# Patient Record
Sex: FEMALE | Race: WHITE
[De-identification: names, ages, dates, MRNs, and addresses within clinical notes are randomized per-mention and may not be internally consistent; named-entity substitution may affect disease eponyms.]

---

## 2017-04-01 ENCOUNTER — HOSPITAL ENCOUNTER (INPATIENT)
Dept: HOSPITAL 82 - ED | Age: 42
LOS: 4 days | Discharge: HOME | DRG: 872 | End: 2017-04-05
Attending: INTERNAL MEDICINE | Admitting: INTERNAL MEDICINE
Payer: COMMERCIAL

## 2017-04-01 VITALS — SYSTOLIC BLOOD PRESSURE: 124 MMHG | DIASTOLIC BLOOD PRESSURE: 77 MMHG

## 2017-04-01 VITALS — WEIGHT: 229 LBS | HEIGHT: 61 IN | BODY MASS INDEX: 43.23 KG/M2

## 2017-04-01 DIAGNOSIS — A41.9: Primary | ICD-10-CM

## 2017-04-01 DIAGNOSIS — N10: ICD-10-CM

## 2017-04-01 DIAGNOSIS — B96.20: ICD-10-CM

## 2017-04-01 DIAGNOSIS — R11.2: ICD-10-CM

## 2017-04-01 DIAGNOSIS — Z23: ICD-10-CM

## 2017-04-01 DIAGNOSIS — Z87.891: ICD-10-CM

## 2017-04-01 LAB
ALBUMIN SERPL-MCNC: 3.4 G/DL (ref 3.2–5)
ALP SERPL-CCNC: 278 U/L (ref 38–126)
ALT SERPL-CCNC: 36 U/L (ref 9–52)
ANION GAP SERPL CALCULATED.3IONS-SCNC: 18 MMOL/L
AST SERPL-CCNC: 26 U/L (ref 14–36)
BACTERIA #/AREA URNS HPF: (no result) HPF
BASOPHILS NFR BLD AUTO: 0 % (ref 0–3)
BILIRUB UR QL STRIP.AUTO: NEGATIVE
BUN SERPL-MCNC: 15 MG/DL (ref 7–17)
BUN/CREAT SERPL: 20
CALCIUM SERPL-MCNC: 9.5 MG/DL (ref 8.4–10.2)
CHLORIDE SERPL-SCNC: 95 MMOL/L (ref 95–108)
CLARITY UR: (no result)
CO2 SERPL-SCNC: 30 MMOL/L (ref 22–30)
COLOR UR AUTO: YELLOW
CREAT SERPL-MCNC: 0.8 MG/DL (ref 0.5–1)
EOSINOPHIL NFR BLD AUTO: 1 % (ref 0–8)
ERYTHROCYTE [DISTWIDTH] IN BLOOD BY AUTOMATED COUNT: 13.8 % (ref 11.5–15.5)
GLUCOSE SERPL-MCNC: 131 MG/DL (ref 65–105)
GLUCOSE UR STRIP.AUTO-MCNC: NEGATIVE MG/DL
HCT VFR BLD AUTO: 36.2 % (ref 37–47)
HGB BLD-MCNC: 12.1 G/DL (ref 12–16)
HGB UR QL STRIP.AUTO: (no result)
IMM GRANULOCYTES NFR BLD: 1.1 % (ref 0–1)
KETONES UR STRIP.AUTO-MCNC: NEGATIVE MG/DL
LEUKOCYTE ESTERASE UR QL STRIP.AUTO: (no result)
LYMPHOCYTES NFR BLD: 9 % (ref 15–41)
MCH RBC QN AUTO: 26.7 PG  CALC (ref 26–32)
MCHC RBC AUTO-ENTMCNC: 33.4 G/L CALC (ref 32–36)
MCV RBC AUTO: 79.7 FL  CALC (ref 80–100)
MONOCYTES NFR BLD AUTO: 7 % (ref 2–13)
NEUTROPHILS # BLD AUTO: 15.7 THOU/UL (ref 2–7.15)
NEUTROPHILS NFR BLD AUTO: 82 % (ref 42–76)
NITRITE UR QL STRIP.AUTO: POSITIVE
PH UR STRIP.AUTO: 6 [PH] (ref 4.5–8)
PLATELET # BLD AUTO: 405 THOU/UL (ref 130–400)
POTASSIUM SERPL-SCNC: 3.4 MMOL/L (ref 3.5–5.1)
PROT SERPL-MCNC: 7.5 G/DL (ref 6.3–8.2)
PROT UR QL STRIP.AUTO: 100 MG/DL
RBC # BLD AUTO: 4.54 MILL/UL (ref 4.2–5.6)
RBC #/AREA URNS HPF: (no result) RBC/HPF (ref 0–5)
SODIUM SERPL-SCNC: 139 MMOL/L (ref 137–146)
SP GR UR STRIP.AUTO: 1.02
SQUAMOUS URNS QL MICRO: (no result) EPI/HPF
UROBILINOGEN UR QL STRIP.AUTO: >=8 E.U./DL
WBC #/AREA URNS HPF: (no result) WBC/HPF (ref 0–5)

## 2017-04-01 NOTE — NUR
PT ARRIVED TO UNIT VIA WHEELCHAIR WITH LILIAM LYLE. AMBULATED FROM W/C TO BED
INDEPENDENTLY. STATES THAT ANALGESICS ADMINISTERED IN ER WERE EFFECTIVE.
RESPIRATIONS ARE EVEN AND UNLABORED. ORIENTED TO ROOM. SAFETY MEASURES PUT IN
PLACE. CALL LIGHT SYSTEM REVIEWED AND IN REACH.

## 2017-04-01 NOTE — NUR
PT RESTING COMFORTABLY RATES PAIN 0/10 AFTER MEDS AS ORDERED. NO VOMITING SINCE
PTA. SRX2,CALL LIGHT WITHIN REACH. VSS.DAUGHTER-IN -LAW AT BEDSIDE

## 2017-04-02 VITALS — SYSTOLIC BLOOD PRESSURE: 144 MMHG | DIASTOLIC BLOOD PRESSURE: 92 MMHG

## 2017-04-02 VITALS — SYSTOLIC BLOOD PRESSURE: 142 MMHG | DIASTOLIC BLOOD PRESSURE: 88 MMHG

## 2017-04-02 VITALS — DIASTOLIC BLOOD PRESSURE: 68 MMHG | SYSTOLIC BLOOD PRESSURE: 128 MMHG

## 2017-04-02 VITALS — DIASTOLIC BLOOD PRESSURE: 75 MMHG | SYSTOLIC BLOOD PRESSURE: 123 MMHG

## 2017-04-02 VITALS — DIASTOLIC BLOOD PRESSURE: 81 MMHG | SYSTOLIC BLOOD PRESSURE: 125 MMHG

## 2017-04-02 LAB
ANION GAP SERPL CALCULATED.3IONS-SCNC: 14 MMOL/L
BASOPHILS NFR BLD AUTO: 0 % (ref 0–3)
BUN SERPL-MCNC: 15 MG/DL (ref 7–17)
BUN/CREAT SERPL: 19
CALCIUM SERPL-MCNC: 8.2 MG/DL (ref 8.4–10.2)
CHLORIDE SERPL-SCNC: 100 MMOL/L (ref 95–108)
CO2 SERPL-SCNC: 28 MMOL/L (ref 22–30)
CREAT SERPL-MCNC: 0.8 MG/DL (ref 0.5–1)
EOSINOPHIL NFR BLD AUTO: 1 % (ref 0–8)
ERYTHROCYTE [DISTWIDTH] IN BLOOD BY AUTOMATED COUNT: 14 % (ref 11.5–15.5)
GLUCOSE SERPL-MCNC: 190 MG/DL (ref 65–105)
HCT VFR BLD AUTO: 30.4 % (ref 37–47)
HGB BLD-MCNC: 10.1 G/DL (ref 12–16)
IMM GRANULOCYTES NFR BLD: 2 % (ref 0–1)
LYMPHOCYTES NFR BLD: 12 % (ref 15–41)
MCH RBC QN AUTO: 26.8 PG  CALC (ref 26–32)
MCHC RBC AUTO-ENTMCNC: 33.2 G/L CALC (ref 32–36)
MCV RBC AUTO: 80.6 FL  CALC (ref 80–100)
MONOCYTES NFR BLD AUTO: 8 % (ref 2–13)
NEUTROPHILS # BLD AUTO: 13.19 THOU/UL (ref 2–7.15)
NEUTROPHILS NFR BLD AUTO: 77 % (ref 42–76)
PLATELET # BLD AUTO: 367 THOU/UL (ref 130–400)
POTASSIUM SERPL-SCNC: 3.5 MMOL/L (ref 3.5–5.1)
RBC # BLD AUTO: 3.77 MILL/UL (ref 4.2–5.6)
SODIUM SERPL-SCNC: 137 MMOL/L (ref 137–146)

## 2017-04-02 NOTE — NUR
PT RESTING IN SEMI FOWLERS POSITION;PT DENIES ANY PAIN OR DISCOMFORTS;IV
FLUIDS INFUSING WELL TO LAC;ASSESSMENT COMPLETED;PT HAS A CURRENT TEMP OF
100.8;TYLENOL PROVIDED PER PRN ORDER;BLANKETS REMOVED,AC LOWERED AND COOL
PACKS PROVIDED;PT AMBULATED TO RESTROOM WITH STEADY GAIT;PT DENIES ANY NEEDS
AT THIS TIME;SAFETY PRECAUTIONS IN PLACE;BED IN LOWEST POSITION WITH CALL
LIGHT IN REACH;WILL CONTINUE TO MONITOR

## 2017-04-02 NOTE — NUR
PT C/O FLANK PAIN RATING 6 OUT OF 10; MEDICATED WITH LORTAB PER PRN ORDERS;
IVF INFUSING AT PRESCRIBED RATE; PT DENIES ANY N/V AT THIS TIME; PT ENCOURAGED
TO CALL FOR ANY ASSISTANCE NEEDED; CALL LIGHT WITHIN REACH; WILL CONTINUE TO
MONITOR

## 2017-04-02 NOTE — NUR
PT RESTING IN SEMI FOWLERS POSITION;PT COMPLAINS OF BACK PAIN RATING 7/10 ON
THE PAIN SCALE AND REQUESTS PRN PAIN MEDICATION;PT MEDICATED ACCORDINGLY;IV
FLUIDS INFUSING WELL;PT DENIES ANY OTHER NEEDS AT THIS TIME;BED IN LOWEST
POSITION WITH CALL LIGHT IN REACH;WILL CONTINUE TO MONITOR

## 2017-04-02 NOTE — NUR
REPORT RECIEVED FROM BECKIE MARTINEZ; PT RESTING IN BED; TEMP RECHECKED READING
100.4; PT MEDICATED WITH TYLENOL PER PRN ORDER; WILL RECHECK TEMP LATER; PT
DENIES ANY PAIN OR DISCOMFORT AT THIS TIME; PT ENCOURAGED TO CALL FOR ANY
ASSISTANCE NEEDED; CALL LIGHT WITHIN REACH; WILL CONTINUE TO MONITOR

## 2017-04-02 NOTE — NUR
PT IS ALSEEP AT THIS TIME. NO SIGNS OF PAIN NOTED. DIALUDID GIVEN EARLIER FOR
LOWER BACK PAIN THAT RADIATES TO RIGHT ABDOMEN. RESPIRATIONS EVEN AND
UNLABORED. IV FLUIDS INFUSING ADEQUATLEY IN IV SITE THAT APPEARS HEALTHY.
 AND SON AT BEDSIDE. SCDS IN PLACE. SAFETY MEASURES IN PLACE. CALL
LIGHT IN REACH.

## 2017-04-02 NOTE — NUR
PT SITTING UP IN BED; NO S/S OF DISTRESS NOTED; PT DENIES ANY PAIN AT THIS
TIME; IVF INFUSING AT PRESCRIBED RATE; CALL LIGHT WITHIN REACH; WILL CONTINUE
TO MONITOR

## 2017-04-03 VITALS — DIASTOLIC BLOOD PRESSURE: 88 MMHG | SYSTOLIC BLOOD PRESSURE: 142 MMHG

## 2017-04-03 VITALS — DIASTOLIC BLOOD PRESSURE: 87 MMHG | SYSTOLIC BLOOD PRESSURE: 157 MMHG

## 2017-04-03 VITALS — SYSTOLIC BLOOD PRESSURE: 126 MMHG | DIASTOLIC BLOOD PRESSURE: 73 MMHG

## 2017-04-03 VITALS — DIASTOLIC BLOOD PRESSURE: 91 MMHG | SYSTOLIC BLOOD PRESSURE: 143 MMHG

## 2017-04-03 PROCEDURE — 3E0234Z INTRODUCTION OF SERUM, TOXOID AND VACCINE INTO MUSCLE, PERCUTANEOUS APPROACH: ICD-10-PCS | Performed by: INTERNAL MEDICINE

## 2017-04-03 NOTE — NUR
PATIENT C/O RIGHT SIDED FLANK PAIN-MEDICATED WITH LORTAB AS ORDERED FOR PAIN.
INSTRUCTED REGUARDING I&O AND URINE HAT PLACED IN THE TOILET.  PATIENT IS
VOIDING YELLOW URINE AT THIS TIME-STATES THAT HER URINE IS MUCH LIGHTER THAN
IT WAS.  CALL LIGHT IN REACH. WILL CONT TO MONITOR.

## 2017-04-03 NOTE — NUR
PATIENT RESTING IN BED AT THIS TIME-C/O RIGHT FLANK PAIN 8/10 ON PAIN SCALE.
PATIENT MEDICATED WITH LORTAB 10/325MG PO AS ORDERED.  PATIENT UP TO THE
BR-STEADY ON HER FEET.  PATIENT VOIDED 200CC OF YELLOW URINE-INSTRUCTED TO
SAVE URINE FOR I&O.  PATIENT STATES
THAT SHE HAD BM EARLIER AFTER NOT GOING FOR
5 DAYS.  ABD IS SOFT AND NON-TENDER WITH ACTIVE BS PRESENT. IVF NS PATENT AND
INFUSING AT 150CC/HR VIA LEFT AC SITE-SITE APPEARS HEALTHY AT THIS TIME.
SAFETY PRECAUTIONS REINFORCED.  CALL LIGHT IN REACH. WILL CONT TO MONITOR.

## 2017-04-03 NOTE — NUR
PT RESTING IN SEMI FOWLERS POSITION;PT COMPLAINS OF BACK AND RIGHT FLANK PAIN
RATING 7/10 ON THE PAIN SCALE AND REQUESTS PRN PAIN MEDICATION;MEDICATED
ACCORDINGLY;PT REPORTS THAT SHE HAD VOMITED WITHIN THE HOUR,THIS WAS NOT
OBSERVED BY WRITTER;PRN ZOFRAN PROVIDED;IV FLUIDS INFUSING WELL AT THIS
TIME;PT DENIES ANY OTHER NEEDS;BED IN LOWEST POSITION WITH CALL LIGHT IN
REACH;WILL CONTINUE TO MONITOR

## 2017-04-03 NOTE — NUR
SHIFT CHANGE REPORT FROM ANA ROCKWELL SLEEPING WITH HEAD COVERED AT THIS TIME,
BREATHING EVEN AND NON-LABORED, IVF INFUSING, CALL BELL IN REACH.

## 2017-04-04 VITALS — SYSTOLIC BLOOD PRESSURE: 146 MMHG | DIASTOLIC BLOOD PRESSURE: 90 MMHG

## 2017-04-04 VITALS — DIASTOLIC BLOOD PRESSURE: 75 MMHG | SYSTOLIC BLOOD PRESSURE: 148 MMHG

## 2017-04-04 VITALS — DIASTOLIC BLOOD PRESSURE: 88 MMHG | SYSTOLIC BLOOD PRESSURE: 140 MMHG

## 2017-04-04 VITALS — SYSTOLIC BLOOD PRESSURE: 134 MMHG | DIASTOLIC BLOOD PRESSURE: 90 MMHG

## 2017-04-04 LAB
ANION GAP SERPL CALCULATED.3IONS-SCNC: 14 MMOL/L
BUN SERPL-MCNC: 9 MG/DL (ref 7–17)
BUN/CREAT SERPL: 14
CALCIUM SERPL-MCNC: 8.7 MG/DL (ref 8.4–10.2)
CHLORIDE SERPL-SCNC: 98 MMOL/L (ref 95–108)
CO2 SERPL-SCNC: 30 MMOL/L (ref 22–30)
CREAT SERPL-MCNC: 0.7 MG/DL (ref 0.5–1)
EOSINOPHIL NFR BLD MANUAL: 3 % (ref 0–8)
ERYTHROCYTE [DISTWIDTH] IN BLOOD BY AUTOMATED COUNT: 13.5 % (ref 11.5–15.5)
GLUCOSE SERPL-MCNC: 198 MG/DL (ref 65–105)
HCT VFR BLD AUTO: 31.2 % (ref 37–47)
HGB BLD-MCNC: 10.5 G/DL (ref 12–16)
LYMPHOCYTES NFR BLD MANUAL: 25 % (ref 15–41)
MANUAL DIFF BLD: YES
MCH RBC QN AUTO: 26.7 PG  CALC (ref 26–32)
MCHC RBC AUTO-ENTMCNC: 33.7 G/L CALC (ref 32–36)
MCV RBC AUTO: 79.4 FL  CALC (ref 80–100)
METAMYELOCYTES NFR BLD MANUAL: 3 % (ref 0–1)
MONOCYTES NFR BLD MANUAL: 1 % (ref 2–13)
NEUTS SEG NFR BLD MANUAL: 68 % (ref 42–76)
PLATELET # BLD AUTO: 439 THOU/UL (ref 130–400)
POTASSIUM SERPL-SCNC: 3.7 MMOL/L (ref 3.5–5.1)
RBC # BLD AUTO: 3.93 MILL/UL (ref 4.2–5.6)
SODIUM SERPL-SCNC: 139 MMOL/L (ref 137–146)

## 2017-04-04 NOTE — NUR
RESTING IN BED, STATES HER PAIN TO R. SIDE HAS COMPLETELY SUBSIDED, REQUESTING
SNACK AT THIS TIME, NEEDS ADDRESSED, CALL BELL IN REACH.

## 2017-04-04 NOTE — NUR
PATIENT CALLED AND C/O RIGHT SIDE PAIN-8/10 ON PAIN SCALE.  MEDICATED WITH
LORTAB 10/325MG PO AS ORDERED.  PATIENT CONT TO VOID QS-HAT EMPTIED.  CALL
LIGHT IN REACH. WILL CONT TO MONITOR.

## 2017-04-04 NOTE — NUR
PATIENT UP TO THE BR TO VOID WITHOUT ANY DIFFICULTY.  PATIENT IS STEADY ON HER
FEET.  PATIENT C/O RIGHT FLANK PAIN AND MEDICATED WITH LORTAB 10/325MG PO AS
ORDERED FOR PAIN.  IV SITE APPEARS RED AND PAINFUL.  SITE D/C AND NEW IV SITE
STARTED TO RIGHT FOREARM-#22 GAUGE WITH GOOD BLOOD RETURN.  IVF NS PATENT AND
INFUSING AT 150CC/HR.  IV SITE TO LEFT AC D/RAMÍREZ.  CALL LIGHT IN REACH. WILL
CONT TO MONITOR.

## 2017-04-04 NOTE — NUR
PATIENT APPEARS SLEEPING AT THIS TIME WITH EYES CLOSED.  IVF PATENT AND
INFUSING AT 150CC/HR.  CALL LIGHT IN REACH. WILL CONT TO MONITOR,

## 2017-04-04 NOTE — NUR
SHIFT CHANGE REPORT FROM ANA WILKINS SLEEPING AT THIS TIME BUT AWAKENED SORTLY
AFTER C/O ACHING HEADACHE @ 9/10, CONCERN ADDRESSED WITH MED AND ICE PACK,
IVF INFUSING, CALL BELL IN REACH, WILL CONTINUE TO MONITOR.

## 2017-04-04 NOTE — NUR
PATIENT APPEARS SLEEPING AT THIS TIME WITH EYES CLOSED.  CALL LIGHT IN REACH.
IVF PATENT AND INFUSING AT 150CC/HR VIA LEFT AC IV SITE.  SITE APPEARS HEALTHY
AT TH IS TIME.  WILL CONT TO MONITOR.

## 2017-04-05 VITALS — SYSTOLIC BLOOD PRESSURE: 149 MMHG | DIASTOLIC BLOOD PRESSURE: 76 MMHG

## 2017-04-05 VITALS — SYSTOLIC BLOOD PRESSURE: 155 MMHG | DIASTOLIC BLOOD PRESSURE: 79 MMHG

## 2017-04-05 LAB
ANION GAP SERPL CALCULATED.3IONS-SCNC: 15 MMOL/L
BUN SERPL-MCNC: 10 MG/DL (ref 7–17)
BUN/CREAT SERPL: 16
CALCIUM SERPL-MCNC: 9 MG/DL (ref 8.4–10.2)
CHLORIDE SERPL-SCNC: 99 MMOL/L (ref 95–108)
CO2 SERPL-SCNC: 28 MMOL/L (ref 22–30)
CREAT SERPL-MCNC: 0.7 MG/DL (ref 0.5–1)
EOSINOPHIL NFR BLD MANUAL: 8 % (ref 0–8)
ERYTHROCYTE [DISTWIDTH] IN BLOOD BY AUTOMATED COUNT: 13.5 % (ref 11.5–15.5)
GLUCOSE SERPL-MCNC: 200 MG/DL (ref 65–105)
HCT VFR BLD AUTO: 32.7 % (ref 37–47)
HGB BLD-MCNC: 10.8 G/DL (ref 12–16)
IMM GRANULOCYTES NFR BLD: 7.9 % (ref 0–1)
LYMPHOCYTES NFR BLD MANUAL: 17 % (ref 15–41)
MANUAL DIFF BLD: YES
MCH RBC QN AUTO: 26.1 PG  CALC (ref 26–32)
MCHC RBC AUTO-ENTMCNC: 33 G/L CALC (ref 32–36)
MCV RBC AUTO: 79 FL  CALC (ref 80–100)
METAMYELOCYTES NFR BLD MANUAL: 8 % (ref 0–1)
MONOCYTES NFR BLD MANUAL: 5 % (ref 2–13)
NEUTS BAND NFR BLD MANUAL: 2 % (ref 0–8)
NEUTS SEG NFR BLD MANUAL: 60 % (ref 42–76)
PLATELET # BLD AUTO: 527 THOU/UL (ref 130–400)
POTASSIUM SERPL-SCNC: 3.5 MMOL/L (ref 3.5–5.1)
RBC # BLD AUTO: 4.14 MILL/UL (ref 4.2–5.6)
SODIUM SERPL-SCNC: 139 MMOL/L (ref 137–146)

## 2017-04-05 NOTE — NUR
PT RESTING IN BED; C/O HEADACHE; PT INFORMED WHEN NEXT AVAILABLE DOSE OF PAIN
MEDICATION; IVF INFUSING AT PRESCRIBED RATE; PT DENIES ANY N/V AT THIS TIME;
CALL LIGHT WIHTIN REACH; WILL CONTINUE TO MONITOR

## 2017-04-05 NOTE — NUR
PATIENT C/O RIGHT FLANK AND ABD PAIN-7/10 ON PAIN SCALE.  PATIENT MEDICATED
WITH LORTAB 10/325MG PO AS ORDERED.  CALL LIGHT IN REACH.  IVF PATENT AND
INFUSING AT 150CC/HR.  WILL CONT TO MONITOR.

## 2017-04-05 NOTE — NUR
REPORT RECIEVED FROM LILIAM WILKINS; PT RESTING IN BED WITH EYES CLOSED; NO S/S OF
DISTRESS NOTED; FALL PRECAUTIONS IN PLACE; CALL LIGHT WITHIN REACH; WILL
CONTINUE TO MONITOR

## 2017-04-05 NOTE — NUR
Discharge instructions given. Patient verbalizes understanding of same.
Discharged in stable condition via Ambulatory to Home with
family. All belongings sent with pt.

## 2017-04-05 NOTE — NUR
PATIENT APPEARS SLEEPING POSITIONED ON RIGHT SIDE WITH EYES CLOSED.  CALL
LIGHT IN REACH.  WILL CONT TO MONITOR.

## 2017-10-27 ENCOUNTER — HOSPITAL ENCOUNTER (EMERGENCY)
Dept: HOSPITAL 82 - ED | Age: 42
Discharge: HOME | DRG: 552 | End: 2017-10-27
Payer: COMMERCIAL

## 2017-10-27 VITALS — DIASTOLIC BLOOD PRESSURE: 87 MMHG | SYSTOLIC BLOOD PRESSURE: 137 MMHG

## 2017-10-27 VITALS — BODY MASS INDEX: 41.62 KG/M2 | HEIGHT: 61 IN | WEIGHT: 220.46 LBS

## 2017-10-27 DIAGNOSIS — M25.562: ICD-10-CM

## 2017-10-27 DIAGNOSIS — E11.9: ICD-10-CM

## 2017-10-27 DIAGNOSIS — F17.210: ICD-10-CM

## 2017-10-27 DIAGNOSIS — S16.1XXA: Primary | ICD-10-CM
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
## 2017-10-27 DIAGNOSIS — M54.9: ICD-10-CM
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
## 2017-10-27 DIAGNOSIS — E78.5: ICD-10-CM

## 2017-10-27 DIAGNOSIS — S09.90XA: ICD-10-CM

## 2017-10-27 DIAGNOSIS — M54.2: ICD-10-CM

## 2017-10-31 ENCOUNTER — HOSPITAL ENCOUNTER (OUTPATIENT)
Dept: HOSPITAL 82 - ED | Age: 42
Setting detail: OBSERVATION
LOS: 1 days | Discharge: HOME | DRG: 313 | End: 2017-11-01
Payer: COMMERCIAL

## 2017-10-31 VITALS — SYSTOLIC BLOOD PRESSURE: 136 MMHG | DIASTOLIC BLOOD PRESSURE: 89 MMHG

## 2017-10-31 VITALS — HEIGHT: 61 IN | BODY MASS INDEX: 44.37 KG/M2 | WEIGHT: 235 LBS

## 2017-10-31 DIAGNOSIS — R42: ICD-10-CM

## 2017-10-31 DIAGNOSIS — R20.2: ICD-10-CM

## 2017-10-31 DIAGNOSIS — R07.89: Primary | ICD-10-CM

## 2017-10-31 DIAGNOSIS — I10: ICD-10-CM

## 2017-10-31 DIAGNOSIS — E78.5: ICD-10-CM

## 2017-10-31 DIAGNOSIS — F17.210: ICD-10-CM

## 2017-10-31 DIAGNOSIS — E78.1: ICD-10-CM

## 2017-10-31 DIAGNOSIS — Z79.84: ICD-10-CM

## 2017-10-31 DIAGNOSIS — E11.9: ICD-10-CM

## 2017-10-31 LAB
ALBUMIN SERPL-MCNC: 3.8 G/DL (ref 3.2–5)
ALP SERPL-CCNC: 82 U/L (ref 38–126)
ALT SERPL-CCNC: 27 U/L (ref 9–52)
ANION GAP SERPL CALCULATED.3IONS-SCNC: 14 MMOL/L
AST SERPL-CCNC: 16 U/L (ref 14–36)
BARBITURATES UR-MCNC: NEGATIVE UG/ML
BASOPHILS NFR BLD AUTO: 0 % (ref 0–3)
BILIRUB UR QL STRIP.AUTO: NEGATIVE
BUN SERPL-MCNC: 14 MG/DL (ref 7–17)
BUN/CREAT SERPL: 17
CALCIUM SERPL-MCNC: 9.1 MG/DL (ref 8.4–10.2)
CHLORIDE SERPL-SCNC: 106 MMOL/L (ref 95–108)
CLARITY UR: (no result)
CO2 SERPL-SCNC: 26 MMOL/L (ref 22–30)
COCAINE UR-MCNC: NEGATIVE NG/ML
COLOR UR AUTO: YELLOW
CREAT SERPL-MCNC: 0.8 MG/DL (ref 0.5–1)
EOSINOPHIL NFR BLD AUTO: 3 % (ref 0–8)
ERYTHROCYTE [DISTWIDTH] IN BLOOD BY AUTOMATED COUNT: 14 % (ref 11.5–15.5)
GLUCOSE SERPL-MCNC: 188 MG/DL (ref 65–105)
GLUCOSE UR STRIP.AUTO-MCNC: 100 MG/DL
HCT VFR BLD AUTO: 35.9 % (ref 37–47)
HGB BLD-MCNC: 11.7 G/DL (ref 12–16)
HGB UR QL STRIP.AUTO: NEGATIVE
IMM GRANULOCYTES NFR BLD: 0.6 % (ref 0–1)
KETONES UR STRIP.AUTO-MCNC: NEGATIVE MG/DL
LEUKOCYTE ESTERASE UR QL STRIP.AUTO: NEGATIVE
LYMPHOCYTES NFR BLD: 33 % (ref 15–41)
MCH RBC QN AUTO: 26.6 PG  CALC (ref 26–32)
MCHC RBC AUTO-ENTMCNC: 32.6 G/L CALC (ref 32–36)
MCV RBC AUTO: 81.6 FL  CALC (ref 80–100)
METHADONE SERPL-MCNC: NEGATIVE NG/ML
MONOCYTES NFR BLD AUTO: 5 % (ref 2–13)
MYOGLOBIN SERPL-MCNC: 18 NG/ML (ref 0–62)
NEUTROPHILS # BLD AUTO: 8.22 THOU/UL (ref 2–7.15)
NEUTROPHILS NFR BLD AUTO: 59 % (ref 42–76)
NITRITE UR QL STRIP.AUTO: NEGATIVE
OXCYCODONE: NEGATIVE
PH UR STRIP.AUTO: 6 [PH] (ref 4.5–8)
PLATELET # BLD AUTO: 380 THOU/UL (ref 130–400)
POTASSIUM SERPL-SCNC: 3.6 MMOL/L (ref 3.5–5.1)
PROT SERPL-MCNC: 6.8 G/DL (ref 6.3–8.2)
PROT UR QL STRIP.AUTO: NEGATIVE MG/DL
RBC # BLD AUTO: 4.4 MILL/UL (ref 4.2–5.6)
SODIUM SERPL-SCNC: 142 MMOL/L (ref 137–146)
SP GR UR STRIP.AUTO: >=1.03
TETRAHYDROCANNABIONOL: NEGATIVE
TRICYLIC ANTIDEPRESSANTS: NEGATIVE
UROBILINOGEN UR QL STRIP.AUTO: 0.2 E.U./DL

## 2017-10-31 PROCEDURE — G0378 HOSPITAL OBSERVATION PER HR: HCPCS

## 2017-10-31 NOTE — NUR
Admission Note
 
 
Report Given to:         BECKIE MARTINEZ
Transported by:          X Wheelchair           Stretcher
 
Transported with:        X Nurse     Transporter   X Patent IV    O2
                         X Cardiac Monitor

## 2017-10-31 NOTE — NUR
ARRIVES VIA [facility], IN NAD AT PRESENT. RECEIVED  MG PO AND 2 DOSES
OF NITROGLYCERIN FROM EMS.

## 2017-10-31 NOTE — NUR
PT ARRIVED TO UNIT VIA WHEELCHAIR WITH ER STAFF. ALERT AND ORINETED UPON
ARRIVAL WITH . STATES PAIN LEVEL 7/10 TO CHEST. RESPIRATIONS EVEN
AND UNLABORED WITH SOB ON EXERTION. 98% ON ROOM AIR, HOWEVER, REQUESTS OXYGEN
FOR SOB. PLACED ON 2L PRN. ORIENTED TO ROOM AND CALL LIGHT SYSTEM. SAFETY
MEASURES IMPLEMENTED. CALL LIGHT SYSTEM REVIEWED AND IN REACH.

## 2017-11-01 VITALS — DIASTOLIC BLOOD PRESSURE: 82 MMHG | SYSTOLIC BLOOD PRESSURE: 136 MMHG

## 2017-11-01 VITALS — SYSTOLIC BLOOD PRESSURE: 134 MMHG | DIASTOLIC BLOOD PRESSURE: 75 MMHG

## 2017-11-01 VITALS — SYSTOLIC BLOOD PRESSURE: 149 MMHG | DIASTOLIC BLOOD PRESSURE: 81 MMHG

## 2017-11-01 LAB
ANION GAP SERPL CALCULATED.3IONS-SCNC: 11 MMOL/L
BUN SERPL-MCNC: 15 MG/DL (ref 7–17)
BUN/CREAT SERPL: 20
CALCIUM SERPL-MCNC: 9.2 MG/DL (ref 8.4–10.2)
CHLORIDE SERPL-SCNC: 108 MMOL/L (ref 95–108)
CHOLEST SERPL-MCNC: 188 MG/DL (ref 0–199)
CHOLEST/HDLC SERPL: 5.3 {RATIO}
CO2 SERPL-SCNC: 26 MMOL/L (ref 22–30)
CREAT SERPL-MCNC: 0.8 MG/DL (ref 0.5–1)
GLUCOSE SERPL-MCNC: 196 MG/DL (ref 65–105)
HDLC SERPL-MCNC: 35 MG/DL (ref 40–?)
MAGNESIUM SERPL-MCNC: 1.9 MG/DL (ref 1.6–2.3)
POTASSIUM SERPL-SCNC: 4.1 MMOL/L (ref 3.5–5.1)
SODIUM SERPL-SCNC: 141 MMOL/L (ref 137–146)
TRIGL SERPL-MCNC: 492 MG/DL (ref 30–149)
VLDLC SERPL CALC-MCNC: 98 MG/DL

## 2017-11-01 NOTE — NUR
PT MEDICATED AS ORDERED FOR MIDSTERNAL PAIN 6/10; TELE MONITOR IN PLACE; CALL
BELL WITHIN REACH; WILL CONTINUE TO MONITOR.

## 2017-11-01 NOTE — NUR
Patient consented to receive pneumonia vaccine for this admission. Pneumonia
vaccine NOT given as patient just receive [id number] (Pneumovax) on 04/03/2017 at
this facility. Patient is not due for another pneumonia vaccine at this time.

## 2017-11-01 NOTE — NUR
PT RESTING WITH EYES CLOSED; AROUSED EASILY TO VERBAL STIMULI; TELE MONITOR IN
PLACE; NO COMPLAINTS VOICED AT THIS TIME; CALL BELL WITHIN REACH; WILL
CONTINUE TO MONITOR.

## 2017-12-06 ENCOUNTER — HOSPITAL ENCOUNTER (EMERGENCY)
Dept: HOSPITAL 82 - ED | Age: 42
Discharge: HOME | DRG: 313 | End: 2017-12-06
Payer: COMMERCIAL

## 2017-12-06 VITALS — HEIGHT: 61 IN | WEIGHT: 220.46 LBS | BODY MASS INDEX: 41.62 KG/M2

## 2017-12-06 VITALS — SYSTOLIC BLOOD PRESSURE: 129 MMHG | DIASTOLIC BLOOD PRESSURE: 84 MMHG

## 2017-12-06 DIAGNOSIS — R06.02: ICD-10-CM

## 2017-12-06 DIAGNOSIS — R07.89: Primary | ICD-10-CM

## 2017-12-06 LAB
ALBUMIN SERPL-MCNC: 4 G/DL (ref 3.2–5)
ALP SERPL-CCNC: 84 U/L (ref 38–126)
ALT SERPL-CCNC: 26 U/L (ref 9–52)
ANION GAP SERPL CALCULATED.3IONS-SCNC: 17 MMOL/L
AST SERPL-CCNC: 14 U/L (ref 14–36)
BARBITURATES UR-MCNC: NEGATIVE UG/ML
BASOPHILS NFR BLD AUTO: 0 % (ref 0–3)
BUN SERPL-MCNC: 13 MG/DL (ref 7–17)
BUN/CREAT SERPL: 17
CALCIUM SERPL-MCNC: 9.7 MG/DL (ref 8.4–10.2)
CHLORIDE SERPL-SCNC: 105 MMOL/L (ref 95–108)
CO2 SERPL-SCNC: 22 MMOL/L (ref 22–30)
COCAINE UR-MCNC: NEGATIVE NG/ML
CREAT SERPL-MCNC: 0.8 MG/DL (ref 0.5–1)
EOSINOPHIL NFR BLD AUTO: 2 % (ref 0–8)
ERYTHROCYTE [DISTWIDTH] IN BLOOD BY AUTOMATED COUNT: 14.6 % (ref 11.5–15.5)
GLUCOSE SERPL-MCNC: 194 MG/DL (ref 65–105)
HCT VFR BLD AUTO: 39.3 % (ref 37–47)
HGB BLD-MCNC: 13.1 G/DL (ref 12–16)
IMM GRANULOCYTES NFR BLD: 0.5 % (ref 0–1)
LIPASE SERPL-CCNC: 96 U/L (ref 23–300)
LYMPHOCYTES NFR BLD: 25 % (ref 15–41)
MCH RBC QN AUTO: 27.3 PG  CALC (ref 26–32)
MCHC RBC AUTO-ENTMCNC: 33.3 G/L CALC (ref 32–36)
MCV RBC AUTO: 82 FL  CALC (ref 80–100)
METHADONE SERPL-MCNC: NEGATIVE NG/ML
MONOCYTES NFR BLD AUTO: 4 % (ref 2–13)
MYOGLOBIN SERPL-MCNC: 18 NG/ML (ref 0–62)
NEUTROPHILS # BLD AUTO: 9.73 THOU/UL (ref 2–7.15)
NEUTROPHILS NFR BLD AUTO: 68 % (ref 42–76)
OXCYCODONE: NEGATIVE
PLATELET # BLD AUTO: 433 THOU/UL (ref 130–400)
POTASSIUM SERPL-SCNC: 4.5 MMOL/L (ref 3.5–5.1)
PROT SERPL-MCNC: 7.1 G/DL (ref 6.3–8.2)
RBC # BLD AUTO: 4.79 MILL/UL (ref 4.2–5.6)
SODIUM SERPL-SCNC: 140 MMOL/L (ref 137–146)
TETRAHYDROCANNABIONOL: NEGATIVE
TRICYLIC ANTIDEPRESSANTS: NEGATIVE

## 2018-03-26 ENCOUNTER — HOSPITAL ENCOUNTER (OUTPATIENT)
Dept: HOSPITAL 82 - ED | Age: 43
Setting detail: OBSERVATION
LOS: 1 days | Discharge: HOME | DRG: 313 | End: 2018-03-27
Attending: INTERNAL MEDICINE | Admitting: INTERNAL MEDICINE
Payer: COMMERCIAL

## 2018-03-26 VITALS — SYSTOLIC BLOOD PRESSURE: 139 MMHG | DIASTOLIC BLOOD PRESSURE: 88 MMHG

## 2018-03-26 VITALS — DIASTOLIC BLOOD PRESSURE: 110 MMHG | SYSTOLIC BLOOD PRESSURE: 180 MMHG

## 2018-03-26 VITALS — BODY MASS INDEX: 41.62 KG/M2 | WEIGHT: 220.44 LBS | HEIGHT: 61 IN

## 2018-03-26 DIAGNOSIS — Z91.14: ICD-10-CM

## 2018-03-26 DIAGNOSIS — I10: ICD-10-CM

## 2018-03-26 DIAGNOSIS — F31.9: ICD-10-CM

## 2018-03-26 DIAGNOSIS — E11.9: ICD-10-CM

## 2018-03-26 DIAGNOSIS — M19.90: ICD-10-CM

## 2018-03-26 DIAGNOSIS — R07.2: Primary | ICD-10-CM

## 2018-03-26 DIAGNOSIS — E66.9: ICD-10-CM

## 2018-03-26 DIAGNOSIS — J43.9: ICD-10-CM

## 2018-03-26 DIAGNOSIS — E78.5: ICD-10-CM

## 2018-03-26 DIAGNOSIS — F17.210: ICD-10-CM

## 2018-03-26 LAB
ANION GAP SERPL CALCULATED.3IONS-SCNC: 20 MMOL/L
BASOPHILS NFR BLD AUTO: 0 % (ref 0–3)
BUN SERPL-MCNC: 15 MG/DL (ref 7–17)
BUN/CREAT SERPL: 18
CHLORIDE SERPL-SCNC: 101 MMOL/L (ref 95–108)
CO2 SERPL-SCNC: 25 MMOL/L (ref 22–30)
CREAT SERPL-MCNC: 0.8 MG/DL (ref 0.5–1)
EOSINOPHIL NFR BLD AUTO: 2 % (ref 0–8)
ERYTHROCYTE [DISTWIDTH] IN BLOOD BY AUTOMATED COUNT: 13.9 % (ref 11.5–15.5)
ERYTHROCYTE [DISTWIDTH] IN BLOOD BY AUTOMATED COUNT: 13.9 % (ref 11.5–15.5)
HCT VFR BLD AUTO: 40.8 % (ref 37–47)
HCT VFR BLD AUTO: 42.3 % (ref 37–47)
HGB BLD-MCNC: 13.4 G/DL (ref 12–16)
HGB BLD-MCNC: 13.9 G/DL (ref 12–16)
IMM GRANULOCYTES NFR BLD: 0.6 % (ref 0–1)
LYMPHOCYTES NFR BLD: 22 % (ref 15–41)
MCH RBC QN AUTO: 26.9 PG  CALC (ref 26–32)
MCH RBC QN AUTO: 26.9 PG  CALC (ref 26–32)
MCHC RBC AUTO-ENTMCNC: 32.8 G/L CALC (ref 32–36)
MCHC RBC AUTO-ENTMCNC: 32.9 G/L CALC (ref 32–36)
MCV RBC AUTO: 81.9 FL  CALC (ref 80–100)
MCV RBC AUTO: 82 FL  CALC (ref 80–100)
MONOCYTES NFR BLD AUTO: 4 % (ref 2–13)
NEUTROPHILS # BLD AUTO: 10.19 THOU/UL (ref 2–7.15)
NEUTROPHILS NFR BLD AUTO: 72 % (ref 42–76)
PLATELET # BLD AUTO: 402 THOU/UL (ref 130–400)
PLATELET # BLD AUTO: 438 THOU/UL (ref 130–400)
POTASSIUM SERPL-SCNC: 3.5 MMOL/L (ref 3.5–5.1)
RBC # BLD AUTO: 4.98 MILL/UL (ref 4.2–5.6)
RBC # BLD AUTO: 5.16 MILL/UL (ref 4.2–5.6)
SODIUM SERPL-SCNC: 142 MMOL/L (ref 137–146)

## 2018-03-26 PROCEDURE — G0378 HOSPITAL OBSERVATION PER HR: HCPCS

## 2018-03-26 NOTE — NUR
PATIENT ADMITTED FROM ER VIA STRETCHER WITH ER STAFF IN ATTENDANCE. PATIENT
AMB TO THE STANDING SCALE AND THEN TO BED.  FAMILY AT BEDSIDE.  PATIENT DENIES
ANY PAIN AT THIS ITME. IV SITE TO RIGHT FOREARM WITH IV BOLUS FINISHING
UP-SITE APPEARS HEALTHY AT THIS TIME. PATIENT STATES THAT SHE HAS BEEN HAING
MID-STERNAL CHEST PAIN FOR LAST COUPLE OF DAYS WITH SOMETIMES RADIATING TO
LEFT ARM.  DENIES ANY PAIN AT THIS TIME.  BP WAS ELEVATED WHEN ARRIVED ON UNIT
BUT HAS COME DOWN SINCE.  STATES THAT LAST BM WAS THIS MORNING.  DENIES ANY
DIFFICULTY VOIDING.  ABD IS SOFT WITH BS+. NO PEDAL EDEMA NOTED-PULSE
PALPABLE. PATIENT ORIENTED TO ROOM AND SURROUNDINGS.  INSTRUCTED ON USE OF
NURSE CALL LIGHT SYSTEM, PHONE AND TV REMOTE.  PATIENT EATING FOOD PROVIDED
FROM FAMILY.  SAFETY INSTRUCTIONS REVIEWED WITH PATIENT. CALL LIGHT IN REACH.
PATIENT MEDICATED WITH RESTORIL 30MG AS ORDERED FOR SLEEP.  WILL CONT TO
MONITOR.

## 2018-03-26 NOTE — NUR
TRIED TO CALL REPORT TO ADMITTING RN, WAS TOLD THEY DIDNT KNOW WHO WAS TAKING
REPORT OR WHAT ROOM TO GO TO BC ROOM WAS DIRTY.

## 2018-03-26 NOTE — NUR
Admission Note
 
 
Report Given to:         FRANKY
Transported by:          X Wheelchair           Stretcher
 
Transported with:        X Nurse     Transporter   X Patent IV    O2
                         X Cardiac Monitor
 
 
 
 
          
 
         NEW BED ASSIGNMENT DUE TO DIRTY [facility]

## 2018-03-26 NOTE — NUR
PT C/O MIDSTERNAL CP SINCE LAST NIGHT. DENIES SOB. PT SMELLS OF CIGARETTE SMOKE
, CHEWING GUM UPON ARRIVAL. PT CHANGED INTO GOWN. EKG COMPLETE. IV ESTABLISHED.
MD @ BEDSIDE. PT ADMITS 'EXTRA STRESS LATELY".

## 2018-03-27 VITALS — DIASTOLIC BLOOD PRESSURE: 92 MMHG | SYSTOLIC BLOOD PRESSURE: 161 MMHG

## 2018-03-27 VITALS — DIASTOLIC BLOOD PRESSURE: 85 MMHG | SYSTOLIC BLOOD PRESSURE: 132 MMHG

## 2018-03-27 VITALS — DIASTOLIC BLOOD PRESSURE: 82 MMHG | SYSTOLIC BLOOD PRESSURE: 169 MMHG

## 2018-03-27 VITALS — DIASTOLIC BLOOD PRESSURE: 86 MMHG | SYSTOLIC BLOOD PRESSURE: 149 MMHG

## 2018-03-27 LAB
ANION GAP SERPL CALCULATED.3IONS-SCNC: 16 MMOL/L
BUN SERPL-MCNC: 16 MG/DL (ref 7–17)
BUN/CREAT SERPL: 19
CHLORIDE SERPL-SCNC: 103 MMOL/L (ref 95–108)
CHOLEST SERPL-MCNC: 227 MG/DL (ref 0–199)
CHOLEST/HDLC SERPL: 5.9 {RATIO}
CO2 SERPL-SCNC: 25 MMOL/L (ref 22–30)
CREAT SERPL-MCNC: 0.8 MG/DL (ref 0.5–1)
HDLC SERPL-MCNC: 38 MG/DL (ref 40–?)
LDLC SERPL CALC-MCNC: 98 MG/DL
POTASSIUM SERPL-SCNC: 3.9 MMOL/L (ref 3.5–5.1)
SODIUM SERPL-SCNC: 139 MMOL/L (ref 137–146)
TRIGL SERPL-MCNC: 454 MG/DL (ref 30–149)
VLDLC SERPL CALC-MCNC: 91 MG/DL

## 2018-03-27 NOTE — NUR
PATIENT RESTING IN BED APPEARS SLEEPING AT THIS TIME WITH EYES CLOSED. TELE
MONITORING DEVICE IN PLACE.  CALL LIGHT IN REACH. WILL CONT TO MONITOR.

## 2018-03-27 NOTE — NUR
PT APPEARS TO BE SLEEPING IN SUPINE POSITION;WOKE PT TO OBTAIN VS AND
COMPLETE ASSESSMENT;RESPIRATIONS EVEN AND UNLABORED ON RA,CLEAR LUNG SOUNDS
NOTED;ABDOMEN SOFT ON PALPATION AND ACTIVE IN ALL 4 QUADRANTS;STRONG PEDAL
PULSES;TELE MONITOR IN PLACE;PT COMPLAINS OF HEADACHE PAIN,COOL CLOTH
PROVIDED;#20G TO RIGHT FOREARM FLUSHED AND PATENT,SITE APPEARS HEALTHY;ALL
SAFETY PRECAUTIONS REINOFORCED;PT ENCOURAGED TO EXPRESS NEEDS AND
CONCERNS;CALL LIGHT IN REACH;WILL CONTINUE TO MONITOR

## 2018-03-27 NOTE — NUR
REPORT RECEIVED FROM LILIAM WILKINS;PT APPEARS TO BE SLEEPING IN SEMI FOWLERS
POSITION;NO S/S OF DISTRESS NOTED;RESPIRATIONS EVEN AND UNLABORED ON RA;TELE
MONITOR IN PLACE;CALL LIGHT WITHIN REACH;WILL CONTINUE TO MONITOR

## 2018-03-27 NOTE — NUR
ALL DISCHARGE INFORMATION GIVEN AND QUESTIONS ANSWERED;IV SITE REMOVED WITH
CATHETER INTACT;PT AWAITING RIDE HOME FROM HER ;WILL CONTINUE TO
MONITOR

## 2018-03-27 NOTE — NUR
PATIENT RESTING IN BED-MEDICATED WITH HCTZ AND LISINOPRIL EARLY FOR ELEVATED
BP AND HR.  C/O HEADACHE AND MEDICATED WITH TYLENOL 650MG PO FOR PAIN.  COLD
CLOTH PROVIDED FOR HER FOREHEAD.  CALL LIGHT IN REACH. WILL CONT TO MONITOR.

## 2018-03-27 NOTE — NUR
PT RESTING IN SUPINE POSITION;PT REPORTS A DECREASE IN HEADACHE PAIN NOW
RATING 2/10 ON THE PAIN SCALE;TELE MONITOR IN PLACE;PT DENIES ANY CURRENT
NEEDS;ENCOURAGED TO CALL FOR ASSISTANCE IF NEEDED;CALL LIGHT WITHIN REACH;WILL
CONTINUE TO MONITOR

## 2018-03-27 NOTE — NUR
PT COMPLAINS OF HEADACHE PAIN RATING 8/10 ON THE PAIN SCALE AND REQUESTS PAIN
MEDICATION;PT MEDICATED WITH ULTRAM 50MG PO;COOL CLOTH PROVIDED;WILL MONITOR
FOR EFFECTIVENESS

## 2018-03-27 NOTE — NUR
Discharge instructions given. Patient verbalizes understanding of same.
Discharged in stable condition via Ambulatory to Home with
significant other. All belongings sent with pt.

## 2018-04-03 ENCOUNTER — HOSPITAL ENCOUNTER (EMERGENCY)
Dept: HOSPITAL 82 - ED | Age: 43
Discharge: HOME | DRG: 313 | End: 2018-04-03
Payer: COMMERCIAL

## 2018-04-03 VITALS — BODY MASS INDEX: 42.87 KG/M2 | WEIGHT: 227.08 LBS | HEIGHT: 61 IN

## 2018-04-03 VITALS — DIASTOLIC BLOOD PRESSURE: 98 MMHG | SYSTOLIC BLOOD PRESSURE: 154 MMHG

## 2018-04-03 DIAGNOSIS — R06.02: ICD-10-CM

## 2018-04-03 DIAGNOSIS — R07.89: Primary | ICD-10-CM

## 2018-04-03 DIAGNOSIS — R20.2: ICD-10-CM

## 2018-04-03 DIAGNOSIS — R51: ICD-10-CM

## 2018-04-03 DIAGNOSIS — I10: ICD-10-CM

## 2018-04-03 DIAGNOSIS — F17.210: ICD-10-CM

## 2018-04-03 DIAGNOSIS — R42: ICD-10-CM

## 2018-04-03 LAB
ANION GAP SERPL CALCULATED.3IONS-SCNC: 20 MMOL/L
BASOPHILS NFR BLD AUTO: 0 % (ref 0–3)
BUN SERPL-MCNC: 13 MG/DL (ref 7–17)
BUN/CREAT SERPL: 18
CHLORIDE SERPL-SCNC: 103 MMOL/L (ref 95–108)
CO2 SERPL-SCNC: 21 MMOL/L (ref 22–30)
CREAT SERPL-MCNC: 0.7 MG/DL (ref 0.5–1)
EOSINOPHIL NFR BLD AUTO: 2 % (ref 0–8)
ERYTHROCYTE [DISTWIDTH] IN BLOOD BY AUTOMATED COUNT: 13.9 % (ref 11.5–15.5)
HCT VFR BLD AUTO: 37.7 % (ref 37–47)
HGB BLD-MCNC: 12.4 G/DL (ref 12–16)
IMM GRANULOCYTES NFR BLD: 0.9 % (ref 0–1)
LYMPHOCYTES NFR BLD: 22 % (ref 15–41)
MCH RBC QN AUTO: 27.3 PG  CALC (ref 26–32)
MCHC RBC AUTO-ENTMCNC: 32.9 G/L CALC (ref 32–36)
MCV RBC AUTO: 82.9 FL  CALC (ref 80–100)
MONOCYTES NFR BLD AUTO: 4 % (ref 2–13)
NEUTROPHILS # BLD AUTO: 10.63 THOU/UL (ref 2–7.15)
NEUTROPHILS NFR BLD AUTO: 70 % (ref 42–76)
PLATELET # BLD AUTO: 373 THOU/UL (ref 130–400)
POTASSIUM SERPL-SCNC: 3.8 MMOL/L (ref 3.5–5.1)
RBC # BLD AUTO: 4.55 MILL/UL (ref 4.2–5.6)
SODIUM SERPL-SCNC: 139 MMOL/L (ref 137–146)

## 2018-04-30 ENCOUNTER — HOSPITAL ENCOUNTER (EMERGENCY)
Dept: HOSPITAL 82 - ED | Age: 43
Discharge: HOME | DRG: 313 | End: 2018-04-30
Payer: COMMERCIAL

## 2018-04-30 VITALS — WEIGHT: 242.51 LBS | HEIGHT: 61 IN | BODY MASS INDEX: 45.79 KG/M2

## 2018-04-30 VITALS — DIASTOLIC BLOOD PRESSURE: 82 MMHG | SYSTOLIC BLOOD PRESSURE: 139 MMHG

## 2018-04-30 DIAGNOSIS — F17.200: ICD-10-CM

## 2018-04-30 DIAGNOSIS — R07.89: Primary | ICD-10-CM

## 2018-04-30 DIAGNOSIS — Z91.19: ICD-10-CM

## 2018-04-30 DIAGNOSIS — R11.0: ICD-10-CM

## 2018-04-30 DIAGNOSIS — R06.02: ICD-10-CM

## 2018-04-30 DIAGNOSIS — I10: ICD-10-CM

## 2018-04-30 LAB
ANION GAP SERPL CALCULATED.3IONS-SCNC: 18 MMOL/L
BASOPHILS NFR BLD AUTO: 0 % (ref 0–3)
BUN SERPL-MCNC: 12 MG/DL (ref 7–17)
BUN/CREAT SERPL: 19
CHLORIDE SERPL-SCNC: 98 MMOL/L (ref 95–108)
CO2 SERPL-SCNC: 25 MMOL/L (ref 22–30)
CREAT SERPL-MCNC: 0.7 MG/DL (ref 0.5–1)
EOSINOPHIL NFR BLD AUTO: 2 % (ref 0–8)
ERYTHROCYTE [DISTWIDTH] IN BLOOD BY AUTOMATED COUNT: 14.6 % (ref 11.5–15.5)
HCT VFR BLD AUTO: 39.7 % (ref 37–47)
HGB BLD-MCNC: 13.3 G/DL (ref 12–16)
IMM GRANULOCYTES NFR BLD: 0.8 % (ref 0–1)
LYMPHOCYTES NFR BLD: 22 % (ref 15–41)
MCH RBC QN AUTO: 27.3 PG  CALC (ref 26–32)
MCHC RBC AUTO-ENTMCNC: 33.5 G/L CALC (ref 32–36)
MCV RBC AUTO: 81.4 FL  CALC (ref 80–100)
MONOCYTES NFR BLD AUTO: 5 % (ref 2–13)
NEUTROPHILS # BLD AUTO: 8.43 THOU/UL (ref 2–7.15)
NEUTROPHILS NFR BLD AUTO: 71 % (ref 42–76)
PLATELET # BLD AUTO: 439 THOU/UL (ref 130–400)
POTASSIUM SERPL-SCNC: 3.8 MMOL/L (ref 3.5–5.1)
RBC # BLD AUTO: 4.88 MILL/UL (ref 4.2–5.6)
SODIUM SERPL-SCNC: 138 MMOL/L (ref 137–146)

## 2019-10-20 ENCOUNTER — HOSPITAL ENCOUNTER (EMERGENCY)
Dept: HOSPITAL 82 - ED | Age: 44
Discharge: HOME | End: 2019-10-20
Payer: COMMERCIAL

## 2019-10-20 VITALS — DIASTOLIC BLOOD PRESSURE: 91 MMHG | SYSTOLIC BLOOD PRESSURE: 152 MMHG

## 2019-10-20 VITALS — BODY MASS INDEX: 41.62 KG/M2 | HEIGHT: 61 IN | WEIGHT: 220.46 LBS

## 2019-10-20 DIAGNOSIS — I10: ICD-10-CM

## 2019-10-20 DIAGNOSIS — F17.210: ICD-10-CM
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
## 2019-10-20 DIAGNOSIS — S90.31XA: Primary | ICD-10-CM
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
## 2019-10-20 DIAGNOSIS — J43.9: ICD-10-CM

## 2019-10-20 DIAGNOSIS — E11.9: ICD-10-CM
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
## 2019-11-05 ENCOUNTER — HOSPITAL ENCOUNTER (EMERGENCY)
Dept: HOSPITAL 82 - ED | Age: 44
Discharge: LEFT BEFORE DISCHARGE | End: 2019-11-05
Payer: COMMERCIAL

## 2019-11-05 VITALS — DIASTOLIC BLOOD PRESSURE: 63 MMHG | SYSTOLIC BLOOD PRESSURE: 141 MMHG

## 2019-11-05 VITALS — WEIGHT: 220.46 LBS | BODY MASS INDEX: 41.62 KG/M2 | HEIGHT: 61 IN

## 2019-11-05 DIAGNOSIS — R06.02: ICD-10-CM

## 2019-11-05 DIAGNOSIS — F17.210: ICD-10-CM

## 2019-11-05 DIAGNOSIS — E11.65: ICD-10-CM

## 2019-11-05 DIAGNOSIS — J43.9: ICD-10-CM

## 2019-11-05 DIAGNOSIS — Z79.4: ICD-10-CM

## 2019-11-05 DIAGNOSIS — R11.0: ICD-10-CM

## 2019-11-05 DIAGNOSIS — Z91.19: ICD-10-CM

## 2019-11-05 DIAGNOSIS — Z79.84: ICD-10-CM

## 2019-11-05 DIAGNOSIS — I10: ICD-10-CM

## 2019-11-05 DIAGNOSIS — R07.89: Primary | ICD-10-CM

## 2019-11-05 DIAGNOSIS — Z79.899: ICD-10-CM

## 2019-11-05 LAB
ALBUMIN SERPL-MCNC: 3.8 G/DL (ref 3.2–5)
ALP SERPL-CCNC: 177 U/L (ref 38–126)
ANION GAP SERPL CALCULATED.3IONS-SCNC: 13 MMOL/L
AST SERPL-CCNC: 40 U/L (ref 14–36)
BASOPHILS NFR BLD AUTO: 0 % (ref 0–3)
BUN SERPL-MCNC: 5 MG/DL (ref 7–17)
BUN/CREAT SERPL: 9
CHLORIDE SERPL-SCNC: 96 MMOL/L (ref 95–108)
CO2 SERPL-SCNC: 27 MMOL/L (ref 22–30)
CREAT SERPL-MCNC: 0.5 MG/DL (ref 0.5–1)
EOSINOPHIL NFR BLD AUTO: 3 % (ref 0–8)
ERYTHROCYTE [DISTWIDTH] IN BLOOD BY AUTOMATED COUNT: 13.4 % (ref 11.5–15.5)
HCT VFR BLD AUTO: 40.8 % (ref 37–47)
HGB BLD-MCNC: 14 G/DL (ref 12–16)
IMM GRANULOCYTES NFR BLD: 0.4 % (ref 0–5)
LIPASE SERPL-CCNC: 186 U/L (ref 23–300)
LYMPHOCYTES NFR BLD: 24 % (ref 15–41)
MCH RBC QN AUTO: 28.1 PG  CALC (ref 26–32)
MCHC RBC AUTO-ENTMCNC: 34.3 G/L CALC (ref 32–36)
MCV RBC AUTO: 81.9 FL  CALC (ref 80–100)
MONOCYTES NFR BLD AUTO: 4 % (ref 2–13)
NEUTROPHILS # BLD AUTO: 7.66 THOU/UL (ref 2–7.15)
NEUTROPHILS NFR BLD AUTO: 68 % (ref 42–76)
PLATELET # BLD AUTO: 293 THOU/UL (ref 130–400)
POTASSIUM SERPL-SCNC: 3.2 MMOL/L (ref 3.5–5.1)
PROT SERPL-MCNC: 7.6 G/DL (ref 6.3–8.2)
RBC # BLD AUTO: 4.98 MILL/UL (ref 4.2–5.6)
SODIUM SERPL-SCNC: 133 MMOL/L (ref 137–146)

## 2019-11-07 ENCOUNTER — HOSPITAL ENCOUNTER (EMERGENCY)
Dept: HOSPITAL 82 - ED | Age: 44
Discharge: HOME | End: 2019-11-07
Payer: COMMERCIAL

## 2019-11-07 VITALS — BODY MASS INDEX: 44.83 KG/M2 | HEIGHT: 61 IN | WEIGHT: 237.44 LBS

## 2019-11-07 VITALS — DIASTOLIC BLOOD PRESSURE: 72 MMHG | SYSTOLIC BLOOD PRESSURE: 140 MMHG

## 2019-11-07 DIAGNOSIS — E11.65: Primary | ICD-10-CM

## 2019-11-07 DIAGNOSIS — F17.210: ICD-10-CM

## 2019-11-07 DIAGNOSIS — H53.8: ICD-10-CM

## 2019-11-07 DIAGNOSIS — I10: ICD-10-CM

## 2019-11-07 DIAGNOSIS — R11.0: ICD-10-CM

## 2019-11-07 DIAGNOSIS — J43.9: ICD-10-CM

## 2019-11-07 LAB
ANION GAP SERPL CALCULATED.3IONS-SCNC: 17 MMOL/L
BASOPHILS NFR BLD AUTO: 0 % (ref 0–3)
BILIRUB UR QL STRIP.AUTO: NEGATIVE
BUN SERPL-MCNC: 8 MG/DL (ref 7–17)
BUN/CREAT SERPL: 14
CHLORIDE SERPL-SCNC: 94 MMOL/L (ref 95–108)
CO2 SERPL-SCNC: 26 MMOL/L (ref 22–30)
COLOR UR AUTO: YELLOW
CREAT SERPL-MCNC: 0.6 MG/DL (ref 0.5–1)
EOSINOPHIL NFR BLD AUTO: 2 % (ref 0–8)
ERYTHROCYTE [DISTWIDTH] IN BLOOD BY AUTOMATED COUNT: 13.6 % (ref 11.5–15.5)
GLUCOSE UR STRIP.AUTO-MCNC: >=1000 MG/DL
HCT VFR BLD AUTO: 45 % (ref 37–47)
HGB BLD-MCNC: 15 G/DL (ref 12–16)
HGB UR QL STRIP.AUTO: NEGATIVE
IMM GRANULOCYTES NFR BLD: 0.5 % (ref 0–5)
KETONES UR STRIP.AUTO-MCNC: NEGATIVE MG/DL
LEUKOCYTE ESTERASE UR QL STRIP.AUTO: (no result)
LYMPHOCYTES NFR BLD: 24 % (ref 15–41)
MCH RBC QN AUTO: 27.9 PG  CALC (ref 26–32)
MCHC RBC AUTO-ENTMCNC: 33.3 G/L CALC (ref 32–36)
MCV RBC AUTO: 83.6 FL  CALC (ref 80–100)
MONOCYTES NFR BLD AUTO: 5 % (ref 2–13)
NEUTROPHILS # BLD AUTO: 8.13 THOU/UL (ref 2–7.15)
NEUTROPHILS NFR BLD AUTO: 68 % (ref 42–76)
NITRITE UR QL STRIP.AUTO: NEGATIVE
PH UR STRIP.AUTO: 6.5 [PH] (ref 4.5–8)
PLATELET # BLD AUTO: 328 THOU/UL (ref 130–400)
POTASSIUM SERPL-SCNC: 3.5 MMOL/L (ref 3.5–5.1)
PROT UR QL STRIP.AUTO: NEGATIVE MG/DL
RBC # BLD AUTO: 5.38 MILL/UL (ref 4.2–5.6)
SODIUM SERPL-SCNC: 133 MMOL/L (ref 137–146)
SP GR UR STRIP.AUTO: 1.01
UROBILINOGEN UR QL STRIP.AUTO: 0.2 E.U./DL

## 2020-03-11 ENCOUNTER — HOSPITAL ENCOUNTER (EMERGENCY)
Dept: HOSPITAL 82 - ED | Age: 45
LOS: 1 days | Discharge: LEFT BEFORE DISCHARGE | DRG: 951 | End: 2020-03-12
Payer: COMMERCIAL

## 2020-03-11 VITALS — SYSTOLIC BLOOD PRESSURE: 156 MMHG | DIASTOLIC BLOOD PRESSURE: 113 MMHG

## 2020-03-11 DIAGNOSIS — Z53.21: Primary | ICD-10-CM

## 2020-03-22 ENCOUNTER — HOSPITAL ENCOUNTER (EMERGENCY)
Age: 45
Discharge: HOME | End: 2020-03-22
Payer: COMMERCIAL

## 2020-03-22 DIAGNOSIS — Z79.4: ICD-10-CM

## 2020-03-22 DIAGNOSIS — A60.00: Primary | ICD-10-CM

## 2020-03-22 DIAGNOSIS — B37.3: ICD-10-CM

## 2020-03-22 DIAGNOSIS — E11.9: ICD-10-CM

## 2020-03-22 DIAGNOSIS — F17.200: ICD-10-CM

## 2020-03-22 DIAGNOSIS — I10: ICD-10-CM

## 2020-03-22 DIAGNOSIS — J43.9: ICD-10-CM

## 2020-07-07 ENCOUNTER — HOSPITAL ENCOUNTER (EMERGENCY)
Dept: HOSPITAL 82 - ED | Age: 45
Discharge: HOME | End: 2020-07-07
Payer: COMMERCIAL

## 2020-07-07 VITALS — WEIGHT: 200.4 LBS | BODY MASS INDEX: 37.84 KG/M2 | HEIGHT: 61 IN

## 2020-07-07 VITALS — SYSTOLIC BLOOD PRESSURE: 142 MMHG | DIASTOLIC BLOOD PRESSURE: 80 MMHG

## 2020-07-07 DIAGNOSIS — B37.3: ICD-10-CM

## 2020-07-07 DIAGNOSIS — I10: ICD-10-CM

## 2020-07-07 DIAGNOSIS — J43.9: ICD-10-CM

## 2020-07-07 DIAGNOSIS — Z79.4: ICD-10-CM

## 2020-07-07 DIAGNOSIS — E11.65: Primary | ICD-10-CM

## 2020-07-07 DIAGNOSIS — F17.210: ICD-10-CM

## 2020-07-07 LAB
ALBUMIN SERPL-MCNC: 4.2 G/DL (ref 3.2–5)
ALP SERPL-CCNC: 122 U/L (ref 38–126)
ANION GAP SERPL CALCULATED.3IONS-SCNC: 13 MMOL/L
AST SERPL-CCNC: 23 U/L (ref 14–36)
BASOPHILS NFR BLD AUTO: 0 % (ref 0–3)
BILIRUB UR QL STRIP.AUTO: NEGATIVE
BUN SERPL-MCNC: 10 MG/DL (ref 7–17)
BUN/CREAT SERPL: 15
CHLORIDE SERPL-SCNC: 97 MMOL/L (ref 95–108)
CO2 SERPL-SCNC: 25 MMOL/L (ref 22–30)
COLOR UR AUTO: YELLOW
CREAT SERPL-MCNC: 0.7 MG/DL (ref 0.5–1)
EOSINOPHIL NFR BLD AUTO: 2 % (ref 0–8)
ERYTHROCYTE [DISTWIDTH] IN BLOOD BY AUTOMATED COUNT: 12.8 % (ref 11.5–15.5)
GLUCOSE UR STRIP.AUTO-MCNC: >=1000 MG/DL
HCT VFR BLD AUTO: 43.7 % (ref 37–47)
HGB BLD-MCNC: 14.4 G/DL (ref 12–16)
HGB UR QL STRIP.AUTO: NEGATIVE
IMM GRANULOCYTES NFR BLD: 0.7 % (ref 0–5)
KETONES UR STRIP.AUTO-MCNC: NEGATIVE MG/DL
LEUKOCYTE ESTERASE UR QL STRIP.AUTO: (no result)
LYMPHOCYTES NFR BLD: 25 % (ref 15–41)
MCH RBC QN AUTO: 27.9 PG  CALC (ref 26–32)
MCHC RBC AUTO-ENTMCNC: 33 G/DL CAL (ref 32–36)
MCV RBC AUTO: 84.7 FL  CALC (ref 80–100)
MONOCYTES NFR BLD AUTO: 5 % (ref 2–13)
NEUTROPHILS # BLD AUTO: 7.27 THOU/UL (ref 2–7.15)
NEUTROPHILS NFR BLD AUTO: 68 % (ref 42–76)
NITRITE UR QL STRIP.AUTO: NEGATIVE
PH UR STRIP.AUTO: 6.5 [PH] (ref 4.5–8)
PLATELET # BLD AUTO: 368 THOU/UL (ref 130–400)
POTASSIUM SERPL-SCNC: 3.2 MMOL/L (ref 3.5–5.1)
PROT SERPL-MCNC: 7.8 G/DL (ref 6.3–8.2)
PROT UR QL STRIP.AUTO: NEGATIVE MG/DL
RBC # BLD AUTO: 5.16 MILL/UL (ref 4.2–5.6)
SODIUM SERPL-SCNC: 132 MMOL/L (ref 137–146)
SP GR UR STRIP.AUTO: 1.01
UROBILINOGEN UR QL STRIP.AUTO: 0.2 E.U./DL

## 2020-07-08 ENCOUNTER — HOSPITAL ENCOUNTER (EMERGENCY)
Dept: HOSPITAL 82 - ED | Age: 45
Discharge: HOME | End: 2020-07-08
Payer: COMMERCIAL

## 2020-07-08 VITALS — SYSTOLIC BLOOD PRESSURE: 163 MMHG | DIASTOLIC BLOOD PRESSURE: 87 MMHG

## 2020-07-08 VITALS — BODY MASS INDEX: 37.5 KG/M2 | HEIGHT: 61 IN | WEIGHT: 198.64 LBS

## 2020-07-08 DIAGNOSIS — I10: ICD-10-CM

## 2020-07-08 DIAGNOSIS — J43.9: ICD-10-CM

## 2020-07-08 DIAGNOSIS — F17.210: ICD-10-CM

## 2020-07-08 DIAGNOSIS — Z79.4: ICD-10-CM

## 2020-07-08 DIAGNOSIS — E11.9: ICD-10-CM

## 2020-07-08 DIAGNOSIS — N76.2: Primary | ICD-10-CM

## 2020-09-30 ENCOUNTER — HOSPITAL ENCOUNTER (EMERGENCY)
Dept: HOSPITAL 82 - ED | Age: 45
Discharge: HOME | End: 2020-09-30
Payer: COMMERCIAL

## 2020-09-30 VITALS — BODY MASS INDEX: 35.92 KG/M2 | HEIGHT: 61 IN | WEIGHT: 190.26 LBS

## 2020-09-30 VITALS — SYSTOLIC BLOOD PRESSURE: 127 MMHG | DIASTOLIC BLOOD PRESSURE: 88 MMHG

## 2020-09-30 DIAGNOSIS — E11.65: ICD-10-CM

## 2020-09-30 DIAGNOSIS — R10.13: Primary | ICD-10-CM

## 2020-09-30 DIAGNOSIS — F17.210: ICD-10-CM

## 2020-09-30 DIAGNOSIS — Z79.4: ICD-10-CM

## 2020-09-30 DIAGNOSIS — R11.2: ICD-10-CM

## 2020-09-30 DIAGNOSIS — J43.9: ICD-10-CM

## 2020-09-30 DIAGNOSIS — I10: ICD-10-CM

## 2020-09-30 LAB
ALBUMIN SERPL-MCNC: 3.7 G/DL (ref 3.2–5)
ALP SERPL-CCNC: 127 U/L (ref 38–126)
ANION GAP SERPL CALCULATED.3IONS-SCNC: 15 MMOL/L
AST SERPL-CCNC: 20 U/L (ref 14–36)
BASOPHILS NFR BLD AUTO: 0 % (ref 0–3)
BILIRUB UR QL STRIP.AUTO: NEGATIVE
BUN SERPL-MCNC: 11 MG/DL (ref 7–17)
BUN/CREAT SERPL: 17
CHLORIDE SERPL-SCNC: 101 MMOL/L (ref 95–108)
CO2 SERPL-SCNC: 21 MMOL/L (ref 22–30)
COLOR UR AUTO: YELLOW
CREAT SERPL-MCNC: 0.7 MG/DL (ref 0.5–1)
EOSINOPHIL NFR BLD AUTO: 2 % (ref 0–8)
ERYTHROCYTE [DISTWIDTH] IN BLOOD BY AUTOMATED COUNT: 12.8 % (ref 11.5–15.5)
GLUCOSE UR STRIP.AUTO-MCNC: >=1000 MG/DL
HCT VFR BLD AUTO: 45.3 % (ref 37–47)
HGB BLD-MCNC: 15.2 G/DL (ref 12–16)
HGB UR QL STRIP.AUTO: (no result)
IMM GRANULOCYTES NFR BLD: 0.4 % (ref 0–5)
KETONES UR STRIP.AUTO-MCNC: NEGATIVE MG/DL
LEUKOCYTE ESTERASE UR QL STRIP.AUTO: NEGATIVE
LIPASE SERPL-CCNC: 148 U/L (ref 23–300)
LYMPHOCYTES NFR BLD: 30 % (ref 15–41)
MCH RBC QN AUTO: 27.8 PG  CALC (ref 26–32)
MCHC RBC AUTO-ENTMCNC: 33.6 G/DL CAL (ref 32–36)
MCV RBC AUTO: 82.8 FL  CALC (ref 80–100)
MONOCYTES NFR BLD AUTO: 5 % (ref 2–13)
NEUTROPHILS # BLD AUTO: 7.41 THOU/UL (ref 2–7.15)
NEUTROPHILS NFR BLD AUTO: 63 % (ref 42–76)
NITRITE UR QL STRIP.AUTO: NEGATIVE
PH UR STRIP.AUTO: 5.5 [PH] (ref 4.5–8)
PLATELET # BLD AUTO: 350 THOU/UL (ref 130–400)
POTASSIUM SERPL-SCNC: 4.1 MMOL/L (ref 3.5–5.1)
PROT SERPL-MCNC: 7 G/DL (ref 6.3–8.2)
PROT UR QL STRIP.AUTO: NEGATIVE MG/DL
RBC # BLD AUTO: 5.47 MILL/UL (ref 4.2–5.6)
RBC #/AREA URNS HPF: (no result) RBC/HPF (ref 0–5)
SODIUM SERPL-SCNC: 133 MMOL/L (ref 137–146)
SP GR UR STRIP.AUTO: 1.01
SQUAMOUS URNS QL MICRO: (no result) EPI/HPF
UROBILINOGEN UR QL STRIP.AUTO: 0.2 E.U./DL
WBC #/AREA URNS HPF: (no result) WBC/HPF (ref 0–5)

## 2021-01-07 ENCOUNTER — HOSPITAL ENCOUNTER (EMERGENCY)
Dept: HOSPITAL 82 - ED | Age: 46
Discharge: HOME | End: 2021-01-07
Payer: COMMERCIAL

## 2021-01-07 VITALS — HEIGHT: 61 IN | BODY MASS INDEX: 34.55 KG/M2 | WEIGHT: 182.98 LBS

## 2021-01-07 VITALS — SYSTOLIC BLOOD PRESSURE: 161 MMHG | DIASTOLIC BLOOD PRESSURE: 87 MMHG

## 2021-01-07 DIAGNOSIS — I10: ICD-10-CM

## 2021-01-07 DIAGNOSIS — E78.5: ICD-10-CM

## 2021-01-07 DIAGNOSIS — F17.200: ICD-10-CM

## 2021-01-07 DIAGNOSIS — Z20.822: ICD-10-CM

## 2021-01-07 DIAGNOSIS — F31.9: ICD-10-CM

## 2021-01-07 DIAGNOSIS — J43.9: ICD-10-CM

## 2021-01-07 DIAGNOSIS — J40: Primary | ICD-10-CM

## 2021-01-07 DIAGNOSIS — Z79.4: ICD-10-CM

## 2021-01-07 DIAGNOSIS — M70.21: ICD-10-CM

## 2021-01-07 DIAGNOSIS — E11.9: ICD-10-CM

## 2021-01-07 LAB
ALBUMIN SERPL-MCNC: 3.8 G/DL (ref 3.2–5)
ALP SERPL-CCNC: 123 U/L (ref 38–126)
ANION GAP SERPL CALCULATED.3IONS-SCNC: 15 MMOL/L
AST SERPL-CCNC: 22 U/L (ref 14–36)
BASOPHILS NFR BLD AUTO: 0 % (ref 0–3)
BILIRUB UR QL STRIP.AUTO: NEGATIVE
BUN SERPL-MCNC: 12 MG/DL (ref 7–17)
BUN/CREAT SERPL: 23
CHLORIDE SERPL-SCNC: 101 MMOL/L (ref 95–108)
CO2 SERPL-SCNC: 21 MMOL/L (ref 22–30)
COLOR UR AUTO: YELLOW
CREAT SERPL-MCNC: 0.5 MG/DL (ref 0.5–1)
EOSINOPHIL NFR BLD AUTO: 2 % (ref 0–8)
ERYTHROCYTE [DISTWIDTH] IN BLOOD BY AUTOMATED COUNT: 12.5 % (ref 11.5–15.5)
GLUCOSE UR STRIP.AUTO-MCNC: >=1000 MG/DL
HCT VFR BLD AUTO: 46.8 % (ref 37–47)
HGB BLD-MCNC: 15.5 G/DL (ref 12–16)
HGB UR QL STRIP.AUTO: NEGATIVE
IMM GRANULOCYTES NFR BLD: 0.4 % (ref 0–5)
KETONES UR STRIP.AUTO-MCNC: 15 MG/DL
LEUKOCYTE ESTERASE UR QL STRIP.AUTO: NEGATIVE
LYMPHOCYTES NFR BLD: 23 % (ref 15–41)
MCH RBC QN AUTO: 27.3 PG  CALC (ref 26–32)
MCHC RBC AUTO-ENTMCNC: 33.1 G/DL CAL (ref 32–36)
MCV RBC AUTO: 82.5 FL  CALC (ref 80–100)
MONOCYTES NFR BLD AUTO: 4 % (ref 2–13)
NEUTROPHILS # BLD AUTO: 7.34 THOU/UL (ref 2–7.15)
NEUTROPHILS NFR BLD AUTO: 70 % (ref 42–76)
NITRITE UR QL STRIP.AUTO: NEGATIVE
PH UR STRIP.AUTO: 6 [PH] (ref 4.5–8)
PLATELET # BLD AUTO: 364 THOU/UL (ref 130–400)
POTASSIUM SERPL-SCNC: 4 MMOL/L (ref 3.5–5.1)
PROT SERPL-MCNC: 7.4 G/DL (ref 6.3–8.2)
PROT UR QL STRIP.AUTO: NEGATIVE MG/DL
RBC # BLD AUTO: 5.67 MILL/UL (ref 4.2–5.6)
SODIUM SERPL-SCNC: 133 MMOL/L (ref 137–146)
SP GR UR STRIP.AUTO: 1.01
UROBILINOGEN UR QL STRIP.AUTO: 1 E.U./DL

## 2021-03-02 ENCOUNTER — HOSPITAL ENCOUNTER (EMERGENCY)
Dept: HOSPITAL 82 - ED | Age: 46
LOS: 1 days | Discharge: HOME | End: 2021-03-03
Payer: COMMERCIAL

## 2021-03-02 ENCOUNTER — HOSPITAL ENCOUNTER (EMERGENCY)
Dept: HOSPITAL 82 - ED | Age: 46
Discharge: LEFT BEFORE DISCHARGE | End: 2021-03-02
Payer: COMMERCIAL

## 2021-03-02 VITALS — WEIGHT: 198.42 LBS | BODY MASS INDEX: 37.46 KG/M2 | HEIGHT: 61 IN

## 2021-03-02 VITALS — DIASTOLIC BLOOD PRESSURE: 95 MMHG | SYSTOLIC BLOOD PRESSURE: 174 MMHG

## 2021-03-02 VITALS — WEIGHT: 209.44 LBS | HEIGHT: 61 IN | BODY MASS INDEX: 39.54 KG/M2

## 2021-03-02 DIAGNOSIS — F17.210: ICD-10-CM

## 2021-03-02 DIAGNOSIS — Z79.4: ICD-10-CM

## 2021-03-02 DIAGNOSIS — E78.5: ICD-10-CM

## 2021-03-02 DIAGNOSIS — I10: ICD-10-CM

## 2021-03-02 DIAGNOSIS — J43.9: ICD-10-CM

## 2021-03-02 DIAGNOSIS — Z20.822: ICD-10-CM

## 2021-03-02 DIAGNOSIS — K92.1: ICD-10-CM

## 2021-03-02 DIAGNOSIS — E11.65: Primary | ICD-10-CM

## 2021-03-02 DIAGNOSIS — E11.10: Primary | ICD-10-CM

## 2021-03-02 DIAGNOSIS — F17.200: ICD-10-CM

## 2021-03-02 DIAGNOSIS — F31.9: ICD-10-CM

## 2021-03-02 DIAGNOSIS — Z91.19: ICD-10-CM

## 2021-03-02 LAB
ALBUMIN SERPL-MCNC: 4.4 G/DL (ref 3.2–5)
ALP SERPL-CCNC: 131 U/L (ref 38–126)
AMYLASE SERPL-CCNC: 48 U/L (ref 30–110)
ANION GAP SERPL CALCULATED.3IONS-SCNC: 18 MMOL/L
APTT PPP: 22.3 SECONDS (ref 20–32.5)
AST SERPL-CCNC: 33 U/L (ref 14–36)
BASOPHILS NFR BLD AUTO: 0 % (ref 0–3)
BILIRUB UR QL STRIP.AUTO: NEGATIVE
BUN SERPL-MCNC: 11 MG/DL (ref 7–17)
BUN/CREAT SERPL: 17
CHLORIDE SERPL-SCNC: 95 MMOL/L (ref 95–108)
CO2 SERPL-SCNC: 22 MMOL/L (ref 22–30)
COLOR UR AUTO: YELLOW
CREAT SERPL-MCNC: 0.6 MG/DL (ref 0.5–1)
EOSINOPHIL NFR BLD AUTO: 2 % (ref 0–8)
ERYTHROCYTE [DISTWIDTH] IN BLOOD BY AUTOMATED COUNT: 13 % (ref 11.5–15.5)
ETHANOL SERPL-MCNC: 0 MG/DL (ref 0–30)
GLUCOSE UR STRIP.AUTO-MCNC: NEGATIVE MG/DL
HCT VFR BLD AUTO: 49.6 % (ref 37–47)
HGB BLD-MCNC: 15.9 G/DL (ref 12–16)
HGB UR QL STRIP.AUTO: (no result)
IMM GRANULOCYTES NFR BLD: 0.5 % (ref 0–5)
INR PPP: 1 RATIO (ref 0.7–1.3)
KETONES UR STRIP.AUTO-MCNC: NEGATIVE MG/DL
LEUKOCYTE ESTERASE UR QL STRIP.AUTO: NEGATIVE
LIPASE SERPL-CCNC: 127 U/L (ref 23–300)
LYMPHOCYTES NFR BLD: 30 % (ref 15–41)
MAGNESIUM SERPL-MCNC: 2 MG/DL (ref 1.6–2.3)
MCH RBC QN AUTO: 27.3 PG  CALC (ref 26–32)
MCHC RBC AUTO-ENTMCNC: 32.1 G/DL CAL (ref 32–36)
MCV RBC AUTO: 85.1 FL  CALC (ref 80–100)
MONOCYTES NFR BLD AUTO: 5 % (ref 2–13)
NEUTROPHILS # BLD AUTO: 6.87 THOU/UL (ref 2–7.15)
NEUTROPHILS NFR BLD AUTO: 63 % (ref 42–76)
NITRITE UR QL STRIP.AUTO: NEGATIVE
PH UR STRIP.AUTO: 7 [PH] (ref 4.5–8)
PLATELET # BLD AUTO: 370 THOU/UL (ref 130–400)
POTASSIUM SERPL-SCNC: 3.9 MMOL/L (ref 3.5–5.1)
PROT SERPL-MCNC: 8.3 G/DL (ref 6.3–8.2)
PROT UR QL STRIP.AUTO: NEGATIVE MG/DL
PROTHROMBIN TIME: 10.1 SECONDS (ref 9–12.5)
RBC # BLD AUTO: 5.83 MILL/UL (ref 4.2–5.6)
RBC #/AREA URNS HPF: (no result) RBC/HPF (ref 0–5)
SODIUM SERPL-SCNC: 132 MMOL/L (ref 137–146)
SP GR UR STRIP.AUTO: 1.02
SQUAMOUS URNS QL MICRO: (no result) EPI/HPF
UROBILINOGEN UR QL STRIP.AUTO: 0.2 E.U./DL
WBC #/AREA URNS HPF: (no result) WBC/HPF (ref 0–5)

## 2021-03-03 VITALS — DIASTOLIC BLOOD PRESSURE: 76 MMHG | SYSTOLIC BLOOD PRESSURE: 154 MMHG

## 2021-03-03 LAB
ALBUMIN SERPL-MCNC: 4 G/DL (ref 3.2–5)
ALP SERPL-CCNC: 134 U/L (ref 38–126)
ANION GAP SERPL CALCULATED.3IONS-SCNC: 13 MMOL/L
AST SERPL-CCNC: 25 U/L (ref 14–36)
BASOPHILS NFR BLD AUTO: 0 % (ref 0–3)
BUN SERPL-MCNC: 10 MG/DL (ref 7–17)
BUN/CREAT SERPL: 18
CHLORIDE SERPL-SCNC: 98 MMOL/L (ref 95–108)
CO2 SERPL-SCNC: 26 MMOL/L (ref 22–30)
CREAT SERPL-MCNC: 0.6 MG/DL (ref 0.5–1)
EOSINOPHIL NFR BLD AUTO: 2 % (ref 0–8)
ERYTHROCYTE [DISTWIDTH] IN BLOOD BY AUTOMATED COUNT: 12.9 % (ref 11.5–15.5)
HCT VFR BLD AUTO: 49 % (ref 37–47)
HGB BLD-MCNC: 16.2 G/DL (ref 12–16)
IMM GRANULOCYTES NFR BLD: 0.6 % (ref 0–5)
LYMPHOCYTES NFR BLD: 29 % (ref 15–41)
MCH RBC QN AUTO: 27.2 PG  CALC (ref 26–32)
MCHC RBC AUTO-ENTMCNC: 33.1 G/DL CAL (ref 32–36)
MCV RBC AUTO: 82.4 FL  CALC (ref 80–100)
MONOCYTES NFR BLD AUTO: 4 % (ref 2–13)
NEUTROPHILS # BLD AUTO: 8.03 THOU/UL (ref 2–7.15)
NEUTROPHILS NFR BLD AUTO: 65 % (ref 42–76)
PLATELET # BLD AUTO: 391 THOU/UL (ref 130–400)
POTASSIUM SERPL-SCNC: 3.9 MMOL/L (ref 3.5–5.1)
PROT SERPL-MCNC: 7.3 G/DL (ref 6.3–8.2)
RBC # BLD AUTO: 5.95 MILL/UL (ref 4.2–5.6)
SODIUM SERPL-SCNC: 133 MMOL/L (ref 137–146)

## 2021-03-05 ENCOUNTER — HOSPITAL ENCOUNTER (EMERGENCY)
Dept: HOSPITAL 82 - ED | Age: 46
Discharge: HOME | End: 2021-03-05
Payer: COMMERCIAL

## 2021-03-05 VITALS — BODY MASS INDEX: 35.8 KG/M2 | HEIGHT: 61 IN | WEIGHT: 189.6 LBS

## 2021-03-05 VITALS — DIASTOLIC BLOOD PRESSURE: 83 MMHG | SYSTOLIC BLOOD PRESSURE: 143 MMHG

## 2021-03-05 DIAGNOSIS — F31.9: ICD-10-CM

## 2021-03-05 DIAGNOSIS — J43.9: ICD-10-CM

## 2021-03-05 DIAGNOSIS — I10: ICD-10-CM

## 2021-03-05 DIAGNOSIS — Z79.4: ICD-10-CM

## 2021-03-05 DIAGNOSIS — E11.65: Primary | ICD-10-CM

## 2021-03-05 DIAGNOSIS — E78.5: ICD-10-CM

## 2021-03-05 DIAGNOSIS — F17.210: ICD-10-CM

## 2021-03-05 LAB
ALBUMIN SERPL-MCNC: 4 G/DL (ref 3.2–5)
ALP SERPL-CCNC: 145 U/L (ref 38–126)
ANION GAP SERPL CALCULATED.3IONS-SCNC: 14 MMOL/L
AST SERPL-CCNC: 22 U/L (ref 14–36)
BASOPHILS NFR BLD AUTO: 0 % (ref 0–3)
BILIRUB UR QL STRIP.AUTO: NEGATIVE
BUN SERPL-MCNC: 8 MG/DL (ref 7–17)
BUN/CREAT SERPL: 15
CHLORIDE SERPL-SCNC: 97 MMOL/L (ref 95–108)
CO2 SERPL-SCNC: 24 MMOL/L (ref 22–30)
COLOR UR AUTO: YELLOW
CREAT SERPL-MCNC: 0.5 MG/DL (ref 0.5–1)
EOSINOPHIL NFR BLD AUTO: 2 % (ref 0–8)
ERYTHROCYTE [DISTWIDTH] IN BLOOD BY AUTOMATED COUNT: 13 % (ref 11.5–15.5)
GLUCOSE UR STRIP.AUTO-MCNC: NEGATIVE MG/DL
HCT VFR BLD AUTO: 42.8 % (ref 37–47)
HGB BLD-MCNC: 14.4 G/DL (ref 12–16)
HGB UR QL STRIP.AUTO: NEGATIVE
IMM GRANULOCYTES NFR BLD: 0.8 % (ref 0–5)
KETONES UR STRIP.AUTO-MCNC: NEGATIVE MG/DL
LEUKOCYTE ESTERASE UR QL STRIP.AUTO: NEGATIVE
LYMPHOCYTES NFR BLD: 28 % (ref 15–41)
MCH RBC QN AUTO: 27.6 PG  CALC (ref 26–32)
MCHC RBC AUTO-ENTMCNC: 33.6 G/DL CAL (ref 32–36)
MCV RBC AUTO: 82 FL  CALC (ref 80–100)
MONOCYTES NFR BLD AUTO: 6 % (ref 2–13)
NEUTROPHILS # BLD AUTO: 8.47 THOU/UL (ref 2–7.15)
NEUTROPHILS NFR BLD AUTO: 64 % (ref 42–76)
NITRITE UR QL STRIP.AUTO: NEGATIVE
PH UR STRIP.AUTO: 6.5 [PH] (ref 4.5–8)
PLATELET # BLD AUTO: 342 THOU/UL (ref 130–400)
POTASSIUM SERPL-SCNC: 3.6 MMOL/L (ref 3.5–5.1)
PROT SERPL-MCNC: 7.1 G/DL (ref 6.3–8.2)
PROT UR QL STRIP.AUTO: NEGATIVE MG/DL
RBC # BLD AUTO: 5.22 MILL/UL (ref 4.2–5.6)
SODIUM SERPL-SCNC: 131 MMOL/L (ref 137–146)
SP GR UR STRIP.AUTO: 1.01
UROBILINOGEN UR QL STRIP.AUTO: 0.2 E.U./DL

## 2021-03-19 ENCOUNTER — HOSPITAL ENCOUNTER (EMERGENCY)
Dept: HOSPITAL 82 - ED | Age: 46
LOS: 1 days | Discharge: LEFT BEFORE DISCHARGE | End: 2021-03-20
Payer: COMMERCIAL

## 2021-03-19 VITALS — BODY MASS INDEX: 35.8 KG/M2 | HEIGHT: 61 IN | WEIGHT: 189.6 LBS

## 2021-03-19 DIAGNOSIS — Z91.19: ICD-10-CM

## 2021-03-19 DIAGNOSIS — E11.9: ICD-10-CM

## 2021-03-19 DIAGNOSIS — J43.9: ICD-10-CM

## 2021-03-19 DIAGNOSIS — F17.200: ICD-10-CM

## 2021-03-19 DIAGNOSIS — I10: ICD-10-CM

## 2021-03-19 DIAGNOSIS — R07.9: Primary | ICD-10-CM

## 2021-03-19 DIAGNOSIS — F31.9: ICD-10-CM

## 2021-03-19 DIAGNOSIS — E78.5: ICD-10-CM

## 2021-03-19 LAB
ALBUMIN SERPL-MCNC: 3.7 G/DL (ref 3.2–5)
ALP SERPL-CCNC: 134 U/L (ref 38–126)
ANION GAP SERPL CALCULATED.3IONS-SCNC: 14 MMOL/L
AST SERPL-CCNC: 19 U/L (ref 14–36)
BASOPHILS NFR BLD AUTO: 0 % (ref 0–3)
BUN SERPL-MCNC: 12 MG/DL (ref 7–17)
BUN/CREAT SERPL: 21
CHLORIDE SERPL-SCNC: 99 MMOL/L (ref 95–108)
CO2 SERPL-SCNC: 20 MMOL/L (ref 22–30)
CREAT SERPL-MCNC: 0.6 MG/DL (ref 0.5–1)
EOSINOPHIL NFR BLD AUTO: 2 % (ref 0–8)
ERYTHROCYTE [DISTWIDTH] IN BLOOD BY AUTOMATED COUNT: 13.2 % (ref 11.5–15.5)
HCT VFR BLD AUTO: 46.3 % (ref 37–47)
HGB BLD-MCNC: 15.4 G/DL (ref 12–16)
IMM GRANULOCYTES NFR BLD: 0.5 % (ref 0–5)
LYMPHOCYTES NFR BLD: 27 % (ref 15–41)
MCH RBC QN AUTO: 27.5 PG  CALC (ref 26–32)
MCHC RBC AUTO-ENTMCNC: 33.3 G/DL CAL (ref 32–36)
MCV RBC AUTO: 82.8 FL  CALC (ref 80–100)
MONOCYTES NFR BLD AUTO: 4 % (ref 2–13)
NEUTROPHILS # BLD AUTO: 9.1 THOU/UL (ref 2–7.15)
NEUTROPHILS NFR BLD AUTO: 67 % (ref 42–76)
PLATELET # BLD AUTO: 386 THOU/UL (ref 130–400)
POTASSIUM SERPL-SCNC: 3.7 MMOL/L (ref 3.5–5.1)
PROT SERPL-MCNC: 7.4 G/DL (ref 6.3–8.2)
RBC # BLD AUTO: 5.59 MILL/UL (ref 4.2–5.6)
SODIUM SERPL-SCNC: 130 MMOL/L (ref 137–146)

## 2021-03-20 VITALS — SYSTOLIC BLOOD PRESSURE: 143 MMHG | DIASTOLIC BLOOD PRESSURE: 86 MMHG

## 2021-06-03 ENCOUNTER — HOSPITAL ENCOUNTER (EMERGENCY)
Dept: HOSPITAL 82 - ED | Age: 46
Discharge: HOME | End: 2021-06-03
Payer: COMMERCIAL

## 2021-06-03 VITALS — BODY MASS INDEX: 37.84 KG/M2 | HEIGHT: 61 IN | WEIGHT: 200.4 LBS

## 2021-06-03 VITALS — DIASTOLIC BLOOD PRESSURE: 72 MMHG | SYSTOLIC BLOOD PRESSURE: 167 MMHG

## 2021-06-03 DIAGNOSIS — J43.9: ICD-10-CM

## 2021-06-03 DIAGNOSIS — F17.210: ICD-10-CM

## 2021-06-03 DIAGNOSIS — Z79.4: ICD-10-CM

## 2021-06-03 DIAGNOSIS — E78.5: ICD-10-CM

## 2021-06-03 DIAGNOSIS — E11.65: ICD-10-CM

## 2021-06-03 DIAGNOSIS — Z20.822: ICD-10-CM

## 2021-06-03 DIAGNOSIS — Z91.128: ICD-10-CM

## 2021-06-03 DIAGNOSIS — B34.9: Primary | ICD-10-CM

## 2021-06-03 DIAGNOSIS — T46.4X6A: ICD-10-CM

## 2021-06-03 DIAGNOSIS — T38.3X6A: ICD-10-CM

## 2021-06-03 DIAGNOSIS — I10: ICD-10-CM

## 2021-06-03 DIAGNOSIS — F31.9: ICD-10-CM

## 2021-06-03 LAB
ALBUMIN SERPL-MCNC: 3.9 G/DL (ref 3.2–5)
ALP SERPL-CCNC: 130 U/L (ref 38–126)
ANION GAP SERPL CALCULATED.3IONS-SCNC: 14 MMOL/L
AST SERPL-CCNC: 38 U/L (ref 14–36)
BASOPHILS NFR BLD AUTO: 0 % (ref 0–3)
BILIRUB UR QL STRIP.AUTO: NEGATIVE
BUN SERPL-MCNC: 10 MG/DL (ref 7–17)
BUN/CREAT SERPL: 19
CHLORIDE SERPL-SCNC: 95 MMOL/L (ref 95–108)
CO2 SERPL-SCNC: 27 MMOL/L (ref 22–30)
COLOR UR AUTO: YELLOW
CREAT SERPL-MCNC: 0.6 MG/DL (ref 0.5–1)
EOSINOPHIL NFR BLD AUTO: 2 % (ref 0–8)
ERYTHROCYTE [DISTWIDTH] IN BLOOD BY AUTOMATED COUNT: 12.1 % (ref 11.5–15.5)
GLUCOSE UR STRIP.AUTO-MCNC: >=1000 MG/DL
HCT VFR BLD AUTO: 45.6 % (ref 37–47)
HGB BLD-MCNC: 15.6 G/DL (ref 12–16)
HGB UR QL STRIP.AUTO: NEGATIVE
IMM GRANULOCYTES NFR BLD: 0.5 % (ref 0–5)
KETONES UR STRIP.AUTO-MCNC: NEGATIVE MG/DL
LEUKOCYTE ESTERASE UR QL STRIP.AUTO: NEGATIVE
LIPASE SERPL-CCNC: 129 U/L (ref 23–300)
LYMPHOCYTES NFR BLD: 31 % (ref 15–41)
MAGNESIUM SERPL-MCNC: 2 MG/DL (ref 1.6–2.3)
MCH RBC QN AUTO: 27.7 PG  CALC (ref 26–32)
MCHC RBC AUTO-ENTMCNC: 34.2 G/DL CAL (ref 32–36)
MCV RBC AUTO: 81 FL  CALC (ref 80–100)
MONOCYTES NFR BLD AUTO: 6 % (ref 2–13)
NEUTROPHILS # BLD AUTO: 6.1 THOU/UL (ref 2–7.15)
NEUTROPHILS NFR BLD AUTO: 61 % (ref 42–76)
NITRITE UR QL STRIP.AUTO: NEGATIVE
PH UR STRIP.AUTO: 6.5 [PH] (ref 4.5–8)
PLATELET # BLD AUTO: 377 THOU/UL (ref 130–400)
POTASSIUM SERPL-SCNC: 3.8 MMOL/L (ref 3.5–5.1)
PROT SERPL-MCNC: 7.6 G/DL (ref 6.3–8.2)
PROT UR QL STRIP.AUTO: NEGATIVE MG/DL
RBC # BLD AUTO: 5.63 MILL/UL (ref 4.2–5.6)
SODIUM SERPL-SCNC: 132 MMOL/L (ref 137–146)
SP GR UR STRIP.AUTO: 1.01
UROBILINOGEN UR QL STRIP.AUTO: 0.2 E.U./DL

## 2021-07-24 ENCOUNTER — HOSPITAL ENCOUNTER (INPATIENT)
Dept: HOSPITAL 82 - ED | Age: 46
LOS: 10 days | Discharge: HOME | DRG: 872 | End: 2021-08-03
Attending: INTERNAL MEDICINE | Admitting: STUDENT IN AN ORGANIZED HEALTH CARE EDUCATION/TRAINING PROGRAM
Payer: COMMERCIAL

## 2021-07-24 VITALS — SYSTOLIC BLOOD PRESSURE: 112 MMHG | DIASTOLIC BLOOD PRESSURE: 70 MMHG

## 2021-07-24 VITALS — DIASTOLIC BLOOD PRESSURE: 71 MMHG | SYSTOLIC BLOOD PRESSURE: 114 MMHG

## 2021-07-24 VITALS — SYSTOLIC BLOOD PRESSURE: 120 MMHG | DIASTOLIC BLOOD PRESSURE: 65 MMHG

## 2021-07-24 VITALS — SYSTOLIC BLOOD PRESSURE: 92 MMHG | DIASTOLIC BLOOD PRESSURE: 67 MMHG

## 2021-07-24 VITALS — SYSTOLIC BLOOD PRESSURE: 84 MMHG | DIASTOLIC BLOOD PRESSURE: 58 MMHG

## 2021-07-24 VITALS — DIASTOLIC BLOOD PRESSURE: 57 MMHG | SYSTOLIC BLOOD PRESSURE: 95 MMHG

## 2021-07-24 VITALS — SYSTOLIC BLOOD PRESSURE: 91 MMHG | DIASTOLIC BLOOD PRESSURE: 65 MMHG

## 2021-07-24 VITALS — SYSTOLIC BLOOD PRESSURE: 78 MMHG | DIASTOLIC BLOOD PRESSURE: 60 MMHG

## 2021-07-24 VITALS — SYSTOLIC BLOOD PRESSURE: 100 MMHG | DIASTOLIC BLOOD PRESSURE: 70 MMHG

## 2021-07-24 VITALS — SYSTOLIC BLOOD PRESSURE: 91 MMHG | DIASTOLIC BLOOD PRESSURE: 57 MMHG

## 2021-07-24 VITALS — DIASTOLIC BLOOD PRESSURE: 64 MMHG | SYSTOLIC BLOOD PRESSURE: 97 MMHG

## 2021-07-24 VITALS — DIASTOLIC BLOOD PRESSURE: 53 MMHG | SYSTOLIC BLOOD PRESSURE: 84 MMHG

## 2021-07-24 VITALS — SYSTOLIC BLOOD PRESSURE: 87 MMHG | DIASTOLIC BLOOD PRESSURE: 62 MMHG

## 2021-07-24 VITALS — SYSTOLIC BLOOD PRESSURE: 104 MMHG | DIASTOLIC BLOOD PRESSURE: 69 MMHG

## 2021-07-24 VITALS — DIASTOLIC BLOOD PRESSURE: 68 MMHG | SYSTOLIC BLOOD PRESSURE: 125 MMHG

## 2021-07-24 VITALS — DIASTOLIC BLOOD PRESSURE: 66 MMHG | SYSTOLIC BLOOD PRESSURE: 100 MMHG

## 2021-07-24 VITALS — SYSTOLIC BLOOD PRESSURE: 81 MMHG | DIASTOLIC BLOOD PRESSURE: 52 MMHG

## 2021-07-24 VITALS — SYSTOLIC BLOOD PRESSURE: 87 MMHG | DIASTOLIC BLOOD PRESSURE: 61 MMHG

## 2021-07-24 VITALS — DIASTOLIC BLOOD PRESSURE: 65 MMHG | SYSTOLIC BLOOD PRESSURE: 113 MMHG

## 2021-07-24 VITALS — DIASTOLIC BLOOD PRESSURE: 60 MMHG | SYSTOLIC BLOOD PRESSURE: 87 MMHG

## 2021-07-24 VITALS — SYSTOLIC BLOOD PRESSURE: 106 MMHG | DIASTOLIC BLOOD PRESSURE: 66 MMHG

## 2021-07-24 VITALS — DIASTOLIC BLOOD PRESSURE: 78 MMHG | SYSTOLIC BLOOD PRESSURE: 110 MMHG

## 2021-07-24 VITALS — SYSTOLIC BLOOD PRESSURE: 83 MMHG | DIASTOLIC BLOOD PRESSURE: 57 MMHG

## 2021-07-24 VITALS — DIASTOLIC BLOOD PRESSURE: 56 MMHG | SYSTOLIC BLOOD PRESSURE: 89 MMHG

## 2021-07-24 VITALS — BODY MASS INDEX: 37.22 KG/M2 | WEIGHT: 189.6 LBS | HEIGHT: 60 IN

## 2021-07-24 VITALS — DIASTOLIC BLOOD PRESSURE: 58 MMHG | SYSTOLIC BLOOD PRESSURE: 85 MMHG

## 2021-07-24 VITALS — SYSTOLIC BLOOD PRESSURE: 80 MMHG | DIASTOLIC BLOOD PRESSURE: 52 MMHG

## 2021-07-24 VITALS — SYSTOLIC BLOOD PRESSURE: 79 MMHG | DIASTOLIC BLOOD PRESSURE: 50 MMHG

## 2021-07-24 DIAGNOSIS — N12: ICD-10-CM

## 2021-07-24 DIAGNOSIS — T38.3X6A: ICD-10-CM

## 2021-07-24 DIAGNOSIS — E66.9: ICD-10-CM

## 2021-07-24 DIAGNOSIS — F17.200: ICD-10-CM

## 2021-07-24 DIAGNOSIS — A41.51: Primary | ICD-10-CM

## 2021-07-24 DIAGNOSIS — I87.8: ICD-10-CM

## 2021-07-24 DIAGNOSIS — Z79.4: ICD-10-CM

## 2021-07-24 DIAGNOSIS — R65.20: ICD-10-CM

## 2021-07-24 DIAGNOSIS — J43.9: ICD-10-CM

## 2021-07-24 DIAGNOSIS — J81.1: ICD-10-CM

## 2021-07-24 DIAGNOSIS — E87.2: ICD-10-CM

## 2021-07-24 DIAGNOSIS — Z20.822: ICD-10-CM

## 2021-07-24 DIAGNOSIS — N17.9: ICD-10-CM

## 2021-07-24 DIAGNOSIS — T46.5X6A: ICD-10-CM

## 2021-07-24 DIAGNOSIS — F31.9: ICD-10-CM

## 2021-07-24 DIAGNOSIS — E87.6: ICD-10-CM

## 2021-07-24 DIAGNOSIS — E11.65: ICD-10-CM

## 2021-07-24 DIAGNOSIS — Z91.128: ICD-10-CM

## 2021-07-24 DIAGNOSIS — E78.5: ICD-10-CM

## 2021-07-24 DIAGNOSIS — I10: ICD-10-CM

## 2021-07-24 LAB
ALBUMIN SERPL-MCNC: 3.5 G/DL (ref 3.2–5)
ALP SERPL-CCNC: 254 U/L (ref 38–126)
AMYLASE SERPL-CCNC: 33 U/L (ref 30–110)
ANION GAP SERPL CALCULATED.3IONS-SCNC: 11 MMOL/L
ANION GAP SERPL CALCULATED.3IONS-SCNC: 19 MMOL/L
AST SERPL-CCNC: 29 U/L (ref 14–36)
BASOPHILS NFR BLD AUTO: 0 % (ref 0–3)
BASOPHILS NFR BLD AUTO: 0 % (ref 0–3)
BILIRUB UR QL STRIP.AUTO: NEGATIVE
BUN SERPL-MCNC: 18 MG/DL (ref 7–17)
BUN SERPL-MCNC: 18 MG/DL (ref 7–17)
BUN/CREAT SERPL: 12
BUN/CREAT SERPL: 16
CHLORIDE SERPL-SCNC: 104 MMOL/L (ref 95–108)
CHLORIDE SERPL-SCNC: 95 MMOL/L (ref 95–108)
CO2 SERPL-SCNC: 20 MMOL/L (ref 22–30)
CO2 SERPL-SCNC: 24 MMOL/L (ref 22–30)
COLOR UR AUTO: YELLOW
CREAT SERPL-MCNC: 1.1 MG/DL (ref 0.5–1)
CREAT SERPL-MCNC: 1.5 MG/DL (ref 0.5–1)
EOSINOPHIL NFR BLD AUTO: 0 % (ref 0–8)
EOSINOPHIL NFR BLD AUTO: 1 % (ref 0–8)
ERYTHROCYTE [DISTWIDTH] IN BLOOD BY AUTOMATED COUNT: 12.3 % (ref 11.5–15.5)
ERYTHROCYTE [DISTWIDTH] IN BLOOD BY AUTOMATED COUNT: 12.4 % (ref 11.5–15.5)
GLUCOSE UR STRIP.AUTO-MCNC: >=1000 MG/DL
HCT VFR BLD AUTO: 33.8 % (ref 37–47)
HCT VFR BLD AUTO: 40.1 % (ref 37–47)
HGB BLD-MCNC: 11.9 G/DL (ref 12–16)
HGB BLD-MCNC: 14 G/DL (ref 12–16)
HGB UR QL STRIP.AUTO: (no result)
IMM GRANULOCYTES NFR BLD: 11 % (ref 0–5)
IMM GRANULOCYTES NFR BLD: 3.7 % (ref 0–5)
KETONES UR STRIP.AUTO-MCNC: NEGATIVE MG/DL
LEUKOCYTE ESTERASE UR QL STRIP.AUTO: NEGATIVE
LIPASE SERPL-CCNC: 33 U/L (ref 23–300)
LYMPHOCYTES NFR BLD: 3 % (ref 15–41)
LYMPHOCYTES NFR BLD: 4 % (ref 15–41)
MCH RBC QN AUTO: 28.3 PG  CALC (ref 26–32)
MCH RBC QN AUTO: 28.3 PG  CALC (ref 26–32)
MCHC RBC AUTO-ENTMCNC: 34.9 G/DL CAL (ref 32–36)
MCHC RBC AUTO-ENTMCNC: 35.2 G/DL CAL (ref 32–36)
MCV RBC AUTO: 80.3 FL  CALC (ref 80–100)
MCV RBC AUTO: 81 FL  CALC (ref 80–100)
MONOCYTES NFR BLD AUTO: 2 % (ref 2–13)
MONOCYTES NFR BLD AUTO: 4 % (ref 2–13)
NEUTROPHILS # BLD AUTO: 13.46 THOU/UL (ref 2–7.15)
NEUTROPHILS # BLD AUTO: 17.86 THOU/UL (ref 2–7.15)
NEUTROPHILS NFR BLD AUTO: 80 % (ref 42–76)
NEUTROPHILS NFR BLD AUTO: 91 % (ref 42–76)
NITRITE UR QL STRIP.AUTO: NEGATIVE
PH UR STRIP.AUTO: 6.5 [PH] (ref 4.5–8)
PLATELET # BLD AUTO: 204 THOU/UL (ref 130–400)
PLATELET # BLD AUTO: 235 THOU/UL (ref 130–400)
POTASSIUM SERPL-SCNC: 2.6 MMOL/L (ref 3.5–5.1)
POTASSIUM SERPL-SCNC: 3 MMOL/L (ref 3.5–5.1)
PROT SERPL-MCNC: 7.2 G/DL (ref 6.3–8.2)
PROT UR QL STRIP.AUTO: NEGATIVE MG/DL
RBC # BLD AUTO: 4.21 MILL/UL (ref 4.2–5.6)
RBC # BLD AUTO: 4.95 MILL/UL (ref 4.2–5.6)
SODIUM SERPL-SCNC: 131 MMOL/L (ref 137–146)
SODIUM SERPL-SCNC: 136 MMOL/L (ref 137–146)
SP GR UR STRIP.AUTO: <=1.005
UROBILINOGEN UR QL STRIP.AUTO: 0.2 E.U./DL

## 2021-07-25 VITALS — SYSTOLIC BLOOD PRESSURE: 100 MMHG | DIASTOLIC BLOOD PRESSURE: 67 MMHG

## 2021-07-25 VITALS — SYSTOLIC BLOOD PRESSURE: 117 MMHG | DIASTOLIC BLOOD PRESSURE: 65 MMHG

## 2021-07-25 VITALS — SYSTOLIC BLOOD PRESSURE: 145 MMHG | DIASTOLIC BLOOD PRESSURE: 84 MMHG

## 2021-07-25 VITALS — SYSTOLIC BLOOD PRESSURE: 107 MMHG | DIASTOLIC BLOOD PRESSURE: 65 MMHG

## 2021-07-25 VITALS — SYSTOLIC BLOOD PRESSURE: 139 MMHG | DIASTOLIC BLOOD PRESSURE: 79 MMHG

## 2021-07-25 VITALS — SYSTOLIC BLOOD PRESSURE: 220 MMHG | DIASTOLIC BLOOD PRESSURE: 71 MMHG

## 2021-07-25 VITALS — SYSTOLIC BLOOD PRESSURE: 124 MMHG | DIASTOLIC BLOOD PRESSURE: 75 MMHG

## 2021-07-25 VITALS — SYSTOLIC BLOOD PRESSURE: 91 MMHG | DIASTOLIC BLOOD PRESSURE: 52 MMHG

## 2021-07-25 VITALS — SYSTOLIC BLOOD PRESSURE: 113 MMHG | DIASTOLIC BLOOD PRESSURE: 65 MMHG

## 2021-07-25 VITALS — DIASTOLIC BLOOD PRESSURE: 65 MMHG | SYSTOLIC BLOOD PRESSURE: 117 MMHG

## 2021-07-25 VITALS — SYSTOLIC BLOOD PRESSURE: 97 MMHG | DIASTOLIC BLOOD PRESSURE: 65 MMHG

## 2021-07-25 VITALS — SYSTOLIC BLOOD PRESSURE: 100 MMHG | DIASTOLIC BLOOD PRESSURE: 65 MMHG

## 2021-07-25 VITALS — DIASTOLIC BLOOD PRESSURE: 70 MMHG | SYSTOLIC BLOOD PRESSURE: 117 MMHG

## 2021-07-25 VITALS — SYSTOLIC BLOOD PRESSURE: 136 MMHG | DIASTOLIC BLOOD PRESSURE: 79 MMHG

## 2021-07-25 VITALS — DIASTOLIC BLOOD PRESSURE: 80 MMHG | SYSTOLIC BLOOD PRESSURE: 128 MMHG

## 2021-07-25 VITALS — DIASTOLIC BLOOD PRESSURE: 77 MMHG | SYSTOLIC BLOOD PRESSURE: 124 MMHG

## 2021-07-25 VITALS — SYSTOLIC BLOOD PRESSURE: 131 MMHG | DIASTOLIC BLOOD PRESSURE: 78 MMHG

## 2021-07-25 LAB
ANION GAP SERPL CALCULATED.3IONS-SCNC: 11 MMOL/L
BUN SERPL-MCNC: 26 MG/DL (ref 7–17)
BUN/CREAT SERPL: 21
CHLORIDE SERPL-SCNC: 107 MMOL/L (ref 95–108)
CO2 SERPL-SCNC: 19 MMOL/L (ref 22–30)
CREAT SERPL-MCNC: 1.3 MG/DL (ref 0.5–1)
ERYTHROCYTE [DISTWIDTH] IN BLOOD BY AUTOMATED COUNT: 13.1 % (ref 11.5–15.5)
HCT VFR BLD AUTO: 34.8 % (ref 37–47)
HGB BLD-MCNC: 11.9 G/DL (ref 12–16)
MCH RBC QN AUTO: 28.1 PG  CALC (ref 26–32)
MCHC RBC AUTO-ENTMCNC: 34.2 G/DL CAL (ref 32–36)
MCV RBC AUTO: 82.1 FL  CALC (ref 80–100)
PLATELET # BLD AUTO: 171 THOU/UL (ref 130–400)
POTASSIUM SERPL-SCNC: 3.3 MMOL/L (ref 3.5–5.1)
RBC # BLD AUTO: 4.24 MILL/UL (ref 4.2–5.6)
SODIUM SERPL-SCNC: 134 MMOL/L (ref 137–146)

## 2021-07-26 VITALS — SYSTOLIC BLOOD PRESSURE: 152 MMHG | DIASTOLIC BLOOD PRESSURE: 80 MMHG

## 2021-07-26 VITALS — SYSTOLIC BLOOD PRESSURE: 121 MMHG | DIASTOLIC BLOOD PRESSURE: 65 MMHG

## 2021-07-26 VITALS — SYSTOLIC BLOOD PRESSURE: 150 MMHG | DIASTOLIC BLOOD PRESSURE: 83 MMHG

## 2021-07-26 VITALS — SYSTOLIC BLOOD PRESSURE: 135 MMHG | DIASTOLIC BLOOD PRESSURE: 72 MMHG

## 2021-07-26 VITALS — DIASTOLIC BLOOD PRESSURE: 72 MMHG | SYSTOLIC BLOOD PRESSURE: 142 MMHG

## 2021-07-26 VITALS — DIASTOLIC BLOOD PRESSURE: 87 MMHG | SYSTOLIC BLOOD PRESSURE: 150 MMHG

## 2021-07-26 VITALS — DIASTOLIC BLOOD PRESSURE: 78 MMHG | SYSTOLIC BLOOD PRESSURE: 138 MMHG

## 2021-07-26 VITALS — DIASTOLIC BLOOD PRESSURE: 79 MMHG | SYSTOLIC BLOOD PRESSURE: 140 MMHG

## 2021-07-26 VITALS — DIASTOLIC BLOOD PRESSURE: 82 MMHG | SYSTOLIC BLOOD PRESSURE: 143 MMHG

## 2021-07-26 VITALS — DIASTOLIC BLOOD PRESSURE: 86 MMHG | SYSTOLIC BLOOD PRESSURE: 140 MMHG

## 2021-07-26 VITALS — SYSTOLIC BLOOD PRESSURE: 117 MMHG | DIASTOLIC BLOOD PRESSURE: 75 MMHG

## 2021-07-26 VITALS — DIASTOLIC BLOOD PRESSURE: 90 MMHG | SYSTOLIC BLOOD PRESSURE: 165 MMHG

## 2021-07-26 VITALS — DIASTOLIC BLOOD PRESSURE: 84 MMHG | SYSTOLIC BLOOD PRESSURE: 146 MMHG

## 2021-07-26 VITALS — SYSTOLIC BLOOD PRESSURE: 107 MMHG | DIASTOLIC BLOOD PRESSURE: 70 MMHG

## 2021-07-26 VITALS — DIASTOLIC BLOOD PRESSURE: 74 MMHG | SYSTOLIC BLOOD PRESSURE: 124 MMHG

## 2021-07-26 VITALS — SYSTOLIC BLOOD PRESSURE: 138 MMHG | DIASTOLIC BLOOD PRESSURE: 81 MMHG

## 2021-07-26 VITALS — SYSTOLIC BLOOD PRESSURE: 132 MMHG | DIASTOLIC BLOOD PRESSURE: 72 MMHG

## 2021-07-26 VITALS — SYSTOLIC BLOOD PRESSURE: 158 MMHG | DIASTOLIC BLOOD PRESSURE: 92 MMHG

## 2021-07-26 VITALS — DIASTOLIC BLOOD PRESSURE: 67 MMHG | SYSTOLIC BLOOD PRESSURE: 130 MMHG

## 2021-07-26 VITALS — SYSTOLIC BLOOD PRESSURE: 141 MMHG | DIASTOLIC BLOOD PRESSURE: 59 MMHG

## 2021-07-26 LAB
ANION GAP SERPL CALCULATED.3IONS-SCNC: 14 MMOL/L
BUN SERPL-MCNC: 27 MG/DL (ref 7–17)
BUN/CREAT SERPL: 26
CHLORIDE SERPL-SCNC: 108 MMOL/L (ref 95–108)
CO2 SERPL-SCNC: 20 MMOL/L (ref 22–30)
CREAT SERPL-MCNC: 1 MG/DL (ref 0.5–1)
ERYTHROCYTE [DISTWIDTH] IN BLOOD BY AUTOMATED COUNT: 13.8 % (ref 11.5–15.5)
HCT VFR BLD AUTO: 36.7 % (ref 37–47)
HGB BLD-MCNC: 12.2 G/DL (ref 12–16)
MCH RBC QN AUTO: 27.9 PG  CALC (ref 26–32)
MCHC RBC AUTO-ENTMCNC: 33.2 G/DL CAL (ref 32–36)
MCV RBC AUTO: 83.8 FL  CALC (ref 80–100)
PLATELET # BLD AUTO: 118 THOU/UL (ref 130–400)
POTASSIUM SERPL-SCNC: 3.3 MMOL/L (ref 3.5–5.1)
RBC # BLD AUTO: 4.38 MILL/UL (ref 4.2–5.6)
SODIUM SERPL-SCNC: 138 MMOL/L (ref 137–146)

## 2021-07-27 VITALS — SYSTOLIC BLOOD PRESSURE: 152 MMHG | DIASTOLIC BLOOD PRESSURE: 87 MMHG

## 2021-07-27 VITALS — DIASTOLIC BLOOD PRESSURE: 93 MMHG | SYSTOLIC BLOOD PRESSURE: 169 MMHG

## 2021-07-27 VITALS — SYSTOLIC BLOOD PRESSURE: 154 MMHG | DIASTOLIC BLOOD PRESSURE: 73 MMHG

## 2021-07-27 VITALS — SYSTOLIC BLOOD PRESSURE: 168 MMHG | DIASTOLIC BLOOD PRESSURE: 90 MMHG

## 2021-07-27 VITALS — SYSTOLIC BLOOD PRESSURE: 143 MMHG | DIASTOLIC BLOOD PRESSURE: 75 MMHG

## 2021-07-27 VITALS — DIASTOLIC BLOOD PRESSURE: 69 MMHG | SYSTOLIC BLOOD PRESSURE: 143 MMHG

## 2021-07-27 VITALS — SYSTOLIC BLOOD PRESSURE: 142 MMHG | DIASTOLIC BLOOD PRESSURE: 74 MMHG

## 2021-07-27 LAB
ANION GAP SERPL CALCULATED.3IONS-SCNC: 11 MMOL/L
BASOPHILS NFR BLD AUTO: 0 % (ref 0–3)
BUN SERPL-MCNC: 23 MG/DL (ref 7–17)
BUN/CREAT SERPL: 28
CHLORIDE SERPL-SCNC: 103 MMOL/L (ref 95–108)
CO2 SERPL-SCNC: 23 MMOL/L (ref 22–30)
CREAT SERPL-MCNC: 0.8 MG/DL (ref 0.5–1)
EOSINOPHIL NFR BLD AUTO: 1 % (ref 0–8)
ERYTHROCYTE [DISTWIDTH] IN BLOOD BY AUTOMATED COUNT: 14.1 % (ref 11.5–15.5)
HCT VFR BLD AUTO: 32.3 % (ref 37–47)
HGB BLD-MCNC: 11 G/DL (ref 12–16)
IMM GRANULOCYTES NFR BLD: 1.1 % (ref 0–5)
LYMPHOCYTES NFR BLD: 14 % (ref 15–41)
MCH RBC QN AUTO: 28.1 PG  CALC (ref 26–32)
MCHC RBC AUTO-ENTMCNC: 34.1 G/DL CAL (ref 32–36)
MCV RBC AUTO: 82.4 FL  CALC (ref 80–100)
MONOCYTES NFR BLD AUTO: 8 % (ref 2–13)
NEUTROPHILS # BLD AUTO: 7.64 THOU/UL (ref 2–7.15)
NEUTROPHILS NFR BLD AUTO: 76 % (ref 42–76)
PLATELET # BLD AUTO: 117 THOU/UL (ref 130–400)
POTASSIUM SERPL-SCNC: 2.9 MMOL/L (ref 3.5–5.1)
RBC # BLD AUTO: 3.92 MILL/UL (ref 4.2–5.6)
SODIUM SERPL-SCNC: 134 MMOL/L (ref 137–146)

## 2021-07-28 VITALS — SYSTOLIC BLOOD PRESSURE: 153 MMHG | DIASTOLIC BLOOD PRESSURE: 88 MMHG

## 2021-07-28 VITALS — DIASTOLIC BLOOD PRESSURE: 78 MMHG | SYSTOLIC BLOOD PRESSURE: 167 MMHG

## 2021-07-28 VITALS — DIASTOLIC BLOOD PRESSURE: 94 MMHG | SYSTOLIC BLOOD PRESSURE: 149 MMHG

## 2021-07-28 VITALS — SYSTOLIC BLOOD PRESSURE: 149 MMHG | DIASTOLIC BLOOD PRESSURE: 94 MMHG

## 2021-07-28 VITALS — DIASTOLIC BLOOD PRESSURE: 83 MMHG | SYSTOLIC BLOOD PRESSURE: 147 MMHG

## 2021-07-28 VITALS — DIASTOLIC BLOOD PRESSURE: 74 MMHG | SYSTOLIC BLOOD PRESSURE: 153 MMHG

## 2021-07-28 VITALS — DIASTOLIC BLOOD PRESSURE: 81 MMHG | SYSTOLIC BLOOD PRESSURE: 138 MMHG

## 2021-07-28 VITALS — DIASTOLIC BLOOD PRESSURE: 77 MMHG | SYSTOLIC BLOOD PRESSURE: 155 MMHG

## 2021-07-28 VITALS — SYSTOLIC BLOOD PRESSURE: 135 MMHG | DIASTOLIC BLOOD PRESSURE: 72 MMHG

## 2021-07-28 VITALS — SYSTOLIC BLOOD PRESSURE: 152 MMHG | DIASTOLIC BLOOD PRESSURE: 80 MMHG

## 2021-07-28 VITALS — DIASTOLIC BLOOD PRESSURE: 85 MMHG | SYSTOLIC BLOOD PRESSURE: 144 MMHG

## 2021-07-28 VITALS — SYSTOLIC BLOOD PRESSURE: 147 MMHG | DIASTOLIC BLOOD PRESSURE: 90 MMHG

## 2021-07-28 VITALS — DIASTOLIC BLOOD PRESSURE: 79 MMHG | SYSTOLIC BLOOD PRESSURE: 155 MMHG

## 2021-07-28 VITALS — DIASTOLIC BLOOD PRESSURE: 75 MMHG | SYSTOLIC BLOOD PRESSURE: 140 MMHG

## 2021-07-28 LAB
ANION GAP SERPL CALCULATED.3IONS-SCNC: 10 MMOL/L
BASOPHILS NFR BLD AUTO: 0 % (ref 0–3)
BUN SERPL-MCNC: 24 MG/DL (ref 7–17)
BUN/CREAT SERPL: 30
CHLORIDE SERPL-SCNC: 95 MMOL/L (ref 95–108)
CO2 SERPL-SCNC: 32 MMOL/L (ref 22–30)
CREAT SERPL-MCNC: 0.8 MG/DL (ref 0.5–1)
EOSINOPHIL NFR BLD AUTO: 1 % (ref 0–8)
ERYTHROCYTE [DISTWIDTH] IN BLOOD BY AUTOMATED COUNT: 14 % (ref 11.5–15.5)
HCT VFR BLD AUTO: 31.7 % (ref 37–47)
HGB BLD-MCNC: 10.9 G/DL (ref 12–16)
IMM GRANULOCYTES NFR BLD: 1.4 % (ref 0–5)
LYMPHOCYTES NFR BLD: 19 % (ref 15–41)
MAGNESIUM SERPL-MCNC: 1.6 MG/DL (ref 1.6–2.3)
MCH RBC QN AUTO: 28.2 PG  CALC (ref 26–32)
MCHC RBC AUTO-ENTMCNC: 34.4 G/DL CAL (ref 32–36)
MCV RBC AUTO: 82.1 FL  CALC (ref 80–100)
MONOCYTES NFR BLD AUTO: 11 % (ref 2–13)
NEUTROPHILS # BLD AUTO: 6.66 THOU/UL (ref 2–7.15)
NEUTROPHILS NFR BLD AUTO: 68 % (ref 42–76)
PLATELET # BLD AUTO: 134 THOU/UL (ref 130–400)
POTASSIUM SERPL-SCNC: 2.9 MMOL/L (ref 3.5–5.1)
RBC # BLD AUTO: 3.86 MILL/UL (ref 4.2–5.6)
SODIUM SERPL-SCNC: 134 MMOL/L (ref 137–146)

## 2021-07-29 VITALS — SYSTOLIC BLOOD PRESSURE: 164 MMHG | DIASTOLIC BLOOD PRESSURE: 78 MMHG

## 2021-07-29 VITALS — SYSTOLIC BLOOD PRESSURE: 153 MMHG | DIASTOLIC BLOOD PRESSURE: 86 MMHG

## 2021-07-29 VITALS — SYSTOLIC BLOOD PRESSURE: 139 MMHG | DIASTOLIC BLOOD PRESSURE: 73 MMHG

## 2021-07-29 VITALS — SYSTOLIC BLOOD PRESSURE: 139 MMHG | DIASTOLIC BLOOD PRESSURE: 84 MMHG

## 2021-07-29 VITALS — SYSTOLIC BLOOD PRESSURE: 139 MMHG | DIASTOLIC BLOOD PRESSURE: 71 MMHG

## 2021-07-29 VITALS — SYSTOLIC BLOOD PRESSURE: 147 MMHG | DIASTOLIC BLOOD PRESSURE: 81 MMHG

## 2021-07-29 VITALS — SYSTOLIC BLOOD PRESSURE: 151 MMHG | DIASTOLIC BLOOD PRESSURE: 92 MMHG

## 2021-07-29 LAB
ALBUMIN SERPL-MCNC: 2.3 G/DL (ref 3.2–5)
ALP SERPL-CCNC: 425 U/L (ref 38–126)
ANION GAP SERPL CALCULATED.3IONS-SCNC: 10 MMOL/L
AST SERPL-CCNC: 41 U/L (ref 14–36)
BASOPHILS NFR BLD AUTO: 0 % (ref 0–3)
BUN SERPL-MCNC: 22 MG/DL (ref 7–17)
BUN/CREAT SERPL: 32
CHLORIDE SERPL-SCNC: 93 MMOL/L (ref 95–108)
CO2 SERPL-SCNC: 34 MMOL/L (ref 22–30)
CREAT SERPL-MCNC: 0.7 MG/DL (ref 0.5–1)
EOSINOPHIL NFR BLD AUTO: 1 % (ref 0–8)
ERYTHROCYTE [DISTWIDTH] IN BLOOD BY AUTOMATED COUNT: 13.7 % (ref 11.5–15.5)
HCT VFR BLD AUTO: 32.3 % (ref 37–47)
HGB BLD-MCNC: 11 G/DL (ref 12–16)
IMM GRANULOCYTES NFR BLD: 3 % (ref 0–5)
LYMPHOCYTES NFR BLD: 18 % (ref 15–41)
MAGNESIUM SERPL-MCNC: 2.1 MG/DL (ref 1.6–2.3)
MCH RBC QN AUTO: 28 PG  CALC (ref 26–32)
MCHC RBC AUTO-ENTMCNC: 34.1 G/DL CAL (ref 32–36)
MCV RBC AUTO: 82.2 FL  CALC (ref 80–100)
MONOCYTES NFR BLD AUTO: 11 % (ref 2–13)
NEUTROPHILS # BLD AUTO: 8.2 THOU/UL (ref 2–7.15)
NEUTROPHILS NFR BLD AUTO: 68 % (ref 42–76)
PLATELET # BLD AUTO: 179 THOU/UL (ref 130–400)
POTASSIUM SERPL-SCNC: 3 MMOL/L (ref 3.5–5.1)
PROT SERPL-MCNC: 5.4 G/DL (ref 6.3–8.2)
RBC # BLD AUTO: 3.93 MILL/UL (ref 4.2–5.6)
SODIUM SERPL-SCNC: 134 MMOL/L (ref 137–146)

## 2021-07-30 VITALS — SYSTOLIC BLOOD PRESSURE: 123 MMHG | DIASTOLIC BLOOD PRESSURE: 74 MMHG

## 2021-07-30 VITALS — SYSTOLIC BLOOD PRESSURE: 133 MMHG | DIASTOLIC BLOOD PRESSURE: 75 MMHG

## 2021-07-30 VITALS — DIASTOLIC BLOOD PRESSURE: 82 MMHG | SYSTOLIC BLOOD PRESSURE: 144 MMHG

## 2021-07-30 VITALS — SYSTOLIC BLOOD PRESSURE: 110 MMHG | DIASTOLIC BLOOD PRESSURE: 56 MMHG

## 2021-07-30 VITALS — DIASTOLIC BLOOD PRESSURE: 84 MMHG | SYSTOLIC BLOOD PRESSURE: 151 MMHG

## 2021-07-30 VITALS — SYSTOLIC BLOOD PRESSURE: 154 MMHG | DIASTOLIC BLOOD PRESSURE: 86 MMHG

## 2021-07-30 LAB
ANION GAP SERPL CALCULATED.3IONS-SCNC: 10 MMOL/L
BASOPHILS NFR BLD AUTO: 0 % (ref 0–3)
BUN SERPL-MCNC: 15 MG/DL (ref 7–17)
BUN/CREAT SERPL: 26
CHLORIDE SERPL-SCNC: 93 MMOL/L (ref 95–108)
CO2 SERPL-SCNC: 31 MMOL/L (ref 22–30)
CREAT SERPL-MCNC: 0.6 MG/DL (ref 0.5–1)
EOSINOPHIL NFR BLD AUTO: 1 % (ref 0–8)
ERYTHROCYTE [DISTWIDTH] IN BLOOD BY AUTOMATED COUNT: 13.6 % (ref 11.5–15.5)
HCT VFR BLD AUTO: 32.3 % (ref 37–47)
HGB BLD-MCNC: 10.8 G/DL (ref 12–16)
IMM GRANULOCYTES NFR BLD: 4.3 % (ref 0–5)
LYMPHOCYTES NFR BLD: 15 % (ref 15–41)
MCH RBC QN AUTO: 27.8 PG  CALC (ref 26–32)
MCHC RBC AUTO-ENTMCNC: 33.4 G/DL CAL (ref 32–36)
MCV RBC AUTO: 83 FL  CALC (ref 80–100)
MONOCYTES NFR BLD AUTO: 6 % (ref 2–13)
NEUTROPHILS # BLD AUTO: 14.01 THOU/UL (ref 2–7.15)
NEUTROPHILS NFR BLD AUTO: 75 % (ref 42–76)
PLATELET # BLD AUTO: 268 THOU/UL (ref 130–400)
POTASSIUM SERPL-SCNC: 2.9 MMOL/L (ref 3.5–5.1)
RBC # BLD AUTO: 3.89 MILL/UL (ref 4.2–5.6)
SODIUM SERPL-SCNC: 131 MMOL/L (ref 137–146)

## 2021-07-31 VITALS — DIASTOLIC BLOOD PRESSURE: 97 MMHG | SYSTOLIC BLOOD PRESSURE: 165 MMHG

## 2021-07-31 VITALS — SYSTOLIC BLOOD PRESSURE: 144 MMHG | DIASTOLIC BLOOD PRESSURE: 82 MMHG

## 2021-07-31 VITALS — SYSTOLIC BLOOD PRESSURE: 140 MMHG | DIASTOLIC BLOOD PRESSURE: 76 MMHG

## 2021-07-31 VITALS — SYSTOLIC BLOOD PRESSURE: 138 MMHG | DIASTOLIC BLOOD PRESSURE: 81 MMHG

## 2021-07-31 LAB
ANION GAP SERPL CALCULATED.3IONS-SCNC: 10 MMOL/L
BUN SERPL-MCNC: 16 MG/DL (ref 7–17)
BUN/CREAT SERPL: 30
CHLORIDE SERPL-SCNC: 96 MMOL/L (ref 95–108)
CO2 SERPL-SCNC: 31 MMOL/L (ref 22–30)
CREAT SERPL-MCNC: 0.5 MG/DL (ref 0.5–1)
ERYTHROCYTE [DISTWIDTH] IN BLOOD BY AUTOMATED COUNT: 13.8 % (ref 11.5–15.5)
HCT VFR BLD AUTO: 33.2 % (ref 37–47)
HGB BLD-MCNC: 10.9 G/DL (ref 12–16)
MAGNESIUM SERPL-MCNC: 2 MG/DL (ref 1.6–2.3)
MCH RBC QN AUTO: 27.8 PG  CALC (ref 26–32)
MCHC RBC AUTO-ENTMCNC: 32.8 G/DL CAL (ref 32–36)
MCV RBC AUTO: 84.7 FL  CALC (ref 80–100)
PLATELET # BLD AUTO: 350 THOU/UL (ref 130–400)
POTASSIUM SERPL-SCNC: 4.4 MMOL/L (ref 3.5–5.1)
RBC # BLD AUTO: 3.92 MILL/UL (ref 4.2–5.6)
SODIUM SERPL-SCNC: 133 MMOL/L (ref 137–146)

## 2021-08-01 VITALS — SYSTOLIC BLOOD PRESSURE: 138 MMHG | DIASTOLIC BLOOD PRESSURE: 90 MMHG

## 2021-08-01 VITALS — DIASTOLIC BLOOD PRESSURE: 84 MMHG | SYSTOLIC BLOOD PRESSURE: 143 MMHG

## 2021-08-01 VITALS — SYSTOLIC BLOOD PRESSURE: 139 MMHG | DIASTOLIC BLOOD PRESSURE: 65 MMHG

## 2021-08-01 VITALS — SYSTOLIC BLOOD PRESSURE: 160 MMHG | DIASTOLIC BLOOD PRESSURE: 89 MMHG

## 2021-08-01 VITALS — DIASTOLIC BLOOD PRESSURE: 84 MMHG | SYSTOLIC BLOOD PRESSURE: 149 MMHG

## 2021-08-01 LAB
ALBUMIN SERPL-MCNC: 2.6 G/DL (ref 3.2–5)
ALP SERPL-CCNC: 473 U/L (ref 38–126)
ANION GAP SERPL CALCULATED.3IONS-SCNC: 11 MMOL/L
AST SERPL-CCNC: 33 U/L (ref 14–36)
BUN SERPL-MCNC: 17 MG/DL (ref 7–17)
BUN/CREAT SERPL: 26
CHLORIDE SERPL-SCNC: 99 MMOL/L (ref 95–108)
CO2 SERPL-SCNC: 27 MMOL/L (ref 22–30)
CREAT SERPL-MCNC: 0.6 MG/DL (ref 0.5–1)
ERYTHROCYTE [DISTWIDTH] IN BLOOD BY AUTOMATED COUNT: 14.1 % (ref 11.5–15.5)
HCT VFR BLD AUTO: 31.6 % (ref 37–47)
HGB BLD-MCNC: 10.3 G/DL (ref 12–16)
MCH RBC QN AUTO: 28.1 PG  CALC (ref 26–32)
MCHC RBC AUTO-ENTMCNC: 32.6 G/DL CAL (ref 32–36)
MCV RBC AUTO: 86.3 FL  CALC (ref 80–100)
PLATELET # BLD AUTO: 414 THOU/UL (ref 130–400)
POTASSIUM SERPL-SCNC: 4.1 MMOL/L (ref 3.5–5.1)
PROT SERPL-MCNC: 6.1 G/DL (ref 6.3–8.2)
RBC # BLD AUTO: 3.66 MILL/UL (ref 4.2–5.6)
SODIUM SERPL-SCNC: 133 MMOL/L (ref 137–146)

## 2021-08-02 VITALS — DIASTOLIC BLOOD PRESSURE: 77 MMHG | SYSTOLIC BLOOD PRESSURE: 135 MMHG

## 2021-08-02 VITALS — DIASTOLIC BLOOD PRESSURE: 75 MMHG | SYSTOLIC BLOOD PRESSURE: 134 MMHG

## 2021-08-02 VITALS — DIASTOLIC BLOOD PRESSURE: 65 MMHG | SYSTOLIC BLOOD PRESSURE: 120 MMHG

## 2021-08-02 VITALS — SYSTOLIC BLOOD PRESSURE: 140 MMHG | DIASTOLIC BLOOD PRESSURE: 71 MMHG

## 2021-08-02 LAB
ALBUMIN SERPL-MCNC: 2.6 G/DL (ref 3.2–5)
ALP SERPL-CCNC: 411 U/L (ref 38–126)
ANION GAP SERPL CALCULATED.3IONS-SCNC: 11 MMOL/L
AST SERPL-CCNC: 20 U/L (ref 14–36)
BUN SERPL-MCNC: 11 MG/DL (ref 7–17)
BUN/CREAT SERPL: 17
CHLORIDE SERPL-SCNC: 99 MMOL/L (ref 95–108)
CO2 SERPL-SCNC: 27 MMOL/L (ref 22–30)
CREAT SERPL-MCNC: 0.7 MG/DL (ref 0.5–1)
ERYTHROCYTE [DISTWIDTH] IN BLOOD BY AUTOMATED COUNT: 14.2 % (ref 11.5–15.5)
HCT VFR BLD AUTO: 32 % (ref 37–47)
HGB BLD-MCNC: 10.3 G/DL (ref 12–16)
MCH RBC QN AUTO: 27.9 PG  CALC (ref 26–32)
MCHC RBC AUTO-ENTMCNC: 32.2 G/DL CAL (ref 32–36)
MCV RBC AUTO: 86.7 FL  CALC (ref 80–100)
PLATELET # BLD AUTO: 482 THOU/UL (ref 130–400)
POTASSIUM SERPL-SCNC: 4.3 MMOL/L (ref 3.5–5.1)
PROT SERPL-MCNC: 6.2 G/DL (ref 6.3–8.2)
RBC # BLD AUTO: 3.69 MILL/UL (ref 4.2–5.6)
SODIUM SERPL-SCNC: 132 MMOL/L (ref 137–146)

## 2021-08-03 VITALS — SYSTOLIC BLOOD PRESSURE: 139 MMHG | DIASTOLIC BLOOD PRESSURE: 90 MMHG

## 2021-08-03 VITALS — DIASTOLIC BLOOD PRESSURE: 74 MMHG | SYSTOLIC BLOOD PRESSURE: 130 MMHG

## 2021-08-03 LAB
ANION GAP SERPL CALCULATED.3IONS-SCNC: 11 MMOL/L
BUN SERPL-MCNC: 14 MG/DL (ref 7–17)
BUN/CREAT SERPL: 16
CHLORIDE SERPL-SCNC: 100 MMOL/L (ref 95–108)
CO2 SERPL-SCNC: 25 MMOL/L (ref 22–30)
CREAT SERPL-MCNC: 0.8 MG/DL (ref 0.5–1)
ERYTHROCYTE [DISTWIDTH] IN BLOOD BY AUTOMATED COUNT: 14.3 % (ref 11.5–15.5)
HCT VFR BLD AUTO: 32.4 % (ref 37–47)
HGB BLD-MCNC: 10.3 G/DL (ref 12–16)
MAGNESIUM SERPL-MCNC: 2 MG/DL (ref 1.6–2.3)
MCH RBC QN AUTO: 28.1 PG  CALC (ref 26–32)
MCHC RBC AUTO-ENTMCNC: 31.8 G/DL CAL (ref 32–36)
MCV RBC AUTO: 88.3 FL  CALC (ref 80–100)
PLATELET # BLD AUTO: 586 THOU/UL (ref 130–400)
POTASSIUM SERPL-SCNC: 4.8 MMOL/L (ref 3.5–5.1)
RBC # BLD AUTO: 3.67 MILL/UL (ref 4.2–5.6)
SODIUM SERPL-SCNC: 132 MMOL/L (ref 137–146)

## 2021-10-20 ENCOUNTER — HOSPITAL ENCOUNTER (OUTPATIENT)
Dept: HOSPITAL 82 - ED | Age: 46
Setting detail: OBSERVATION
LOS: 1 days | DRG: 313 | End: 2021-10-21
Attending: INTERNAL MEDICINE | Admitting: INTERNAL MEDICINE
Payer: COMMERCIAL

## 2021-10-20 VITALS — BODY MASS INDEX: 32.46 KG/M2 | WEIGHT: 171.94 LBS | HEIGHT: 61 IN

## 2021-10-20 VITALS — SYSTOLIC BLOOD PRESSURE: 161 MMHG | DIASTOLIC BLOOD PRESSURE: 90 MMHG

## 2021-10-20 VITALS — SYSTOLIC BLOOD PRESSURE: 119 MMHG | DIASTOLIC BLOOD PRESSURE: 62 MMHG

## 2021-10-20 DIAGNOSIS — F17.200: ICD-10-CM

## 2021-10-20 DIAGNOSIS — R07.9: Primary | ICD-10-CM

## 2021-10-20 DIAGNOSIS — E11.9: ICD-10-CM

## 2021-10-20 DIAGNOSIS — I10: ICD-10-CM

## 2021-10-20 DIAGNOSIS — R82.71: ICD-10-CM

## 2021-10-20 DIAGNOSIS — Z20.822: ICD-10-CM

## 2021-10-20 DIAGNOSIS — Z79.4: ICD-10-CM

## 2021-10-20 DIAGNOSIS — J43.9: ICD-10-CM

## 2021-10-20 DIAGNOSIS — F31.9: ICD-10-CM

## 2021-10-20 DIAGNOSIS — E78.5: ICD-10-CM

## 2021-10-20 DIAGNOSIS — M79.89: ICD-10-CM

## 2021-10-20 LAB
ALBUMIN SERPL-MCNC: 4.1 G/DL (ref 3.2–5)
ALP SERPL-CCNC: 79 U/L (ref 38–126)
AMYLASE SERPL-CCNC: 63 U/L (ref 30–110)
ANION GAP SERPL CALCULATED.3IONS-SCNC: 13 MMOL/L
APTT PPP: 21.3 SECONDS (ref 20–32.5)
AST SERPL-CCNC: 29 U/L (ref 14–36)
BASOPHILS NFR BLD AUTO: 0 % (ref 0–3)
BILIRUB UR QL STRIP.AUTO: NEGATIVE
BUN SERPL-MCNC: 15 MG/DL (ref 7–17)
BUN/CREAT SERPL: 22
CHLORIDE SERPL-SCNC: 102 MMOL/L (ref 95–108)
CO2 SERPL-SCNC: 25 MMOL/L (ref 22–30)
COLOR UR AUTO: YELLOW
CREAT SERPL-MCNC: 0.7 MG/DL (ref 0.5–1)
D DIMER PPP FEU-MCNC: 0.17 MG/L (ref 0.19–0.6)
EOSINOPHIL NFR BLD AUTO: 1 % (ref 0–8)
ERYTHROCYTE [DISTWIDTH] IN BLOOD BY AUTOMATED COUNT: 12 % (ref 11.5–15.5)
GLUCOSE UR STRIP.AUTO-MCNC: >=1000 MG/DL
HCG UR QL: NEGATIVE
HCT VFR BLD AUTO: 45.1 % (ref 37–47)
HGB BLD-MCNC: 15.2 G/DL (ref 12–16)
HGB UR QL STRIP.AUTO: NEGATIVE
IMM GRANULOCYTES NFR BLD: 0.6 % (ref 0–5)
INR PPP: 1 RATIO (ref 0.7–1.3)
KETONES UR STRIP.AUTO-MCNC: NEGATIVE MG/DL
LEUKOCYTE ESTERASE UR QL STRIP.AUTO: (no result)
LIPASE SERPL-CCNC: 63 U/L (ref 23–300)
LYMPHOCYTES NFR BLD: 32 % (ref 15–41)
MCH RBC QN AUTO: 28.4 PG  CALC (ref 26–32)
MCHC RBC AUTO-ENTMCNC: 33.7 G/DL CAL (ref 32–36)
MCV RBC AUTO: 84.3 FL  CALC (ref 80–100)
MONOCYTES NFR BLD AUTO: 5 % (ref 2–13)
MYOGLOBIN SERPL-MCNC: 13 NG/ML (ref 0–62)
NEUTROPHILS # BLD AUTO: 7.2 THOU/UL (ref 2–7.15)
NEUTROPHILS NFR BLD AUTO: 62 % (ref 42–76)
NITRITE UR QL STRIP.AUTO: NEGATIVE
PH UR STRIP.AUTO: 6.5 [PH] (ref 4.5–8)
PLATELET # BLD AUTO: 371 THOU/UL (ref 130–400)
POTASSIUM SERPL-SCNC: 4 MMOL/L (ref 3.5–5.1)
PROT SERPL-MCNC: 7.8 G/DL (ref 6.3–8.2)
PROT UR QL STRIP.AUTO: NEGATIVE MG/DL
PROTHROMBIN TIME: 10.2 SECONDS (ref 9–12.5)
RBC # BLD AUTO: 5.35 MILL/UL (ref 4.2–5.6)
RBC #/AREA URNS HPF: (no result) RBC/HPF (ref 0–5)
SODIUM SERPL-SCNC: 136 MMOL/L (ref 137–146)
SP GR UR STRIP.AUTO: 1.01
SQUAMOUS URNS QL MICRO: (no result) EPI/HPF
UROBILINOGEN UR QL STRIP.AUTO: 0.2 E.U./DL

## 2021-10-20 PROCEDURE — G0378 HOSPITAL OBSERVATION PER HR: HCPCS

## 2021-10-21 VITALS — SYSTOLIC BLOOD PRESSURE: 142 MMHG | DIASTOLIC BLOOD PRESSURE: 66 MMHG

## 2021-10-21 VITALS — DIASTOLIC BLOOD PRESSURE: 63 MMHG | SYSTOLIC BLOOD PRESSURE: 119 MMHG

## 2021-10-21 VITALS — SYSTOLIC BLOOD PRESSURE: 133 MMHG | DIASTOLIC BLOOD PRESSURE: 61 MMHG

## 2021-10-21 LAB
ALBUMIN SERPL-MCNC: 3.4 G/DL (ref 3.2–5)
ALP SERPL-CCNC: 63 U/L (ref 38–126)
ANION GAP SERPL CALCULATED.3IONS-SCNC: 12 MMOL/L
AST SERPL-CCNC: 24 U/L (ref 14–36)
BASOPHILS NFR BLD AUTO: 0 % (ref 0–3)
BUN SERPL-MCNC: 17 MG/DL (ref 7–17)
BUN/CREAT SERPL: 25
CHLORIDE SERPL-SCNC: 106 MMOL/L (ref 95–108)
CHOLEST SERPL-MCNC: 192 MG/DL (ref 0–199)
CHOLEST/HDLC SERPL: 6.2 {RATIO}
CO2 SERPL-SCNC: 24 MMOL/L (ref 22–30)
CREAT SERPL-MCNC: 0.7 MG/DL (ref 0.5–1)
EOSINOPHIL NFR BLD AUTO: 3 % (ref 0–8)
ERYTHROCYTE [DISTWIDTH] IN BLOOD BY AUTOMATED COUNT: 12 % (ref 11.5–15.5)
HCT VFR BLD AUTO: 40.3 % (ref 37–47)
HDLC SERPL-MCNC: 31 MG/DL (ref 40–?)
HGB BLD-MCNC: 13.6 G/DL (ref 12–16)
IMM GRANULOCYTES NFR BLD: 0.3 % (ref 0–5)
LDLC SERPL CALC-MCNC: 94 MG/DL
LYMPHOCYTES NFR BLD: 44 % (ref 15–41)
MCH RBC QN AUTO: 28.8 PG  CALC (ref 26–32)
MCHC RBC AUTO-ENTMCNC: 33.7 G/DL CAL (ref 32–36)
MCV RBC AUTO: 85.4 FL  CALC (ref 80–100)
MONOCYTES NFR BLD AUTO: 6 % (ref 2–13)
NEUTROPHILS # BLD AUTO: 5.45 THOU/UL (ref 2–7.15)
NEUTROPHILS NFR BLD AUTO: 48 % (ref 42–76)
PLATELET # BLD AUTO: 349 THOU/UL (ref 130–400)
POTASSIUM SERPL-SCNC: 4.1 MMOL/L (ref 3.5–5.1)
PROT SERPL-MCNC: 6.6 G/DL (ref 6.3–8.2)
RBC # BLD AUTO: 4.72 MILL/UL (ref 4.2–5.6)
SODIUM SERPL-SCNC: 138 MMOL/L (ref 137–146)
TRIGL SERPL-MCNC: 333 MG/DL (ref 30–149)
VLDLC SERPL CALC-MCNC: 67 MG/DL